# Patient Record
Sex: FEMALE | Race: BLACK OR AFRICAN AMERICAN | NOT HISPANIC OR LATINO | Employment: FULL TIME | URBAN - METROPOLITAN AREA
[De-identification: names, ages, dates, MRNs, and addresses within clinical notes are randomized per-mention and may not be internally consistent; named-entity substitution may affect disease eponyms.]

---

## 2020-11-20 ENCOUNTER — APPOINTMENT (OUTPATIENT)
Dept: GENERAL RADIOLOGY | Facility: HOSPITAL | Age: 35
End: 2020-11-20

## 2020-11-20 ENCOUNTER — HOSPITAL ENCOUNTER (EMERGENCY)
Facility: HOSPITAL | Age: 35
Discharge: HOME | End: 2020-11-20
Admitting: EMERGENCY MEDICINE

## 2020-11-20 VITALS
SYSTOLIC BLOOD PRESSURE: 111 MMHG | DIASTOLIC BLOOD PRESSURE: 70 MMHG | RESPIRATION RATE: 19 BRPM | HEART RATE: 87 BPM | BODY MASS INDEX: 43.32 KG/M2 | HEIGHT: 63 IN | WEIGHT: 244.49 LBS | OXYGEN SATURATION: 100 % | TEMPERATURE: 98.3 F

## 2020-11-20 DIAGNOSIS — R07.89 CHEST WALL PAIN: Primary | ICD-10-CM

## 2020-11-20 LAB
ALBUMIN SERPL-MCNC: 3.9 G/DL (ref 3.5–5.2)
ALBUMIN/GLOB SERPL: 1.4 G/DL
ALP SERPL-CCNC: 75 U/L (ref 39–117)
ALT SERPL W P-5'-P-CCNC: 13 U/L (ref 1–33)
ANION GAP SERPL CALCULATED.3IONS-SCNC: 10 MMOL/L (ref 5–15)
AST SERPL-CCNC: 15 U/L (ref 1–32)
BASOPHILS # BLD AUTO: 0 10*3/MM3 (ref 0–0.2)
BASOPHILS NFR BLD AUTO: 0.6 % (ref 0–1.5)
BILIRUB SERPL-MCNC: 0.3 MG/DL (ref 0–1.2)
BUN SERPL-MCNC: 8 MG/DL (ref 6–20)
BUN/CREAT SERPL: 9.9 (ref 7–25)
CALCIUM SPEC-SCNC: 9 MG/DL (ref 8.6–10.5)
CHLORIDE SERPL-SCNC: 103 MMOL/L (ref 98–107)
CO2 SERPL-SCNC: 24 MMOL/L (ref 22–29)
CREAT SERPL-MCNC: 0.81 MG/DL (ref 0.57–1)
DEPRECATED RDW RBC AUTO: 41.6 FL (ref 37–54)
EOSINOPHIL # BLD AUTO: 0.2 10*3/MM3 (ref 0–0.4)
EOSINOPHIL NFR BLD AUTO: 2.3 % (ref 0.3–6.2)
ERYTHROCYTE [DISTWIDTH] IN BLOOD BY AUTOMATED COUNT: 14.7 % (ref 12.3–15.4)
GFR SERPL CREATININE-BSD FRML MDRD: 98 ML/MIN/1.73
GLOBULIN UR ELPH-MCNC: 2.8 GM/DL
GLUCOSE SERPL-MCNC: 129 MG/DL (ref 65–99)
HCT VFR BLD AUTO: 39.6 % (ref 34–46.6)
HGB BLD-MCNC: 12.4 G/DL (ref 12–15.9)
HOLD SPECIMEN: NORMAL
LIPASE SERPL-CCNC: 28 U/L (ref 13–60)
LYMPHOCYTES # BLD AUTO: 2.4 10*3/MM3 (ref 0.7–3.1)
LYMPHOCYTES NFR BLD AUTO: 32.1 % (ref 19.6–45.3)
MCH RBC QN AUTO: 25.2 PG (ref 26.6–33)
MCHC RBC AUTO-ENTMCNC: 31.3 G/DL (ref 31.5–35.7)
MCV RBC AUTO: 80.7 FL (ref 79–97)
MONOCYTES # BLD AUTO: 0.6 10*3/MM3 (ref 0.1–0.9)
MONOCYTES NFR BLD AUTO: 8.1 % (ref 5–12)
NEUTROPHILS NFR BLD AUTO: 4.2 10*3/MM3 (ref 1.7–7)
NEUTROPHILS NFR BLD AUTO: 56.9 % (ref 42.7–76)
NRBC BLD AUTO-RTO: 0.1 /100 WBC (ref 0–0.2)
NT-PROBNP SERPL-MCNC: 24.4 PG/ML (ref 0–450)
PLATELET # BLD AUTO: 319 10*3/MM3 (ref 140–450)
PMV BLD AUTO: 7.6 FL (ref 6–12)
POTASSIUM SERPL-SCNC: 4 MMOL/L (ref 3.5–5.2)
PROT SERPL-MCNC: 6.7 G/DL (ref 6–8.5)
RBC # BLD AUTO: 4.91 10*6/MM3 (ref 3.77–5.28)
SARS-COV-2 RNA PNL SPEC NAA+PROBE: NOT DETECTED
SODIUM SERPL-SCNC: 137 MMOL/L (ref 136–145)
TROPONIN T SERPL-MCNC: <0.01 NG/ML (ref 0–0.03)
WBC # BLD AUTO: 7.4 10*3/MM3 (ref 3.4–10.8)
WHOLE BLOOD HOLD SPECIMEN: NORMAL

## 2020-11-20 PROCEDURE — 87635 SARS-COV-2 COVID-19 AMP PRB: CPT | Performed by: PHYSICIAN ASSISTANT

## 2020-11-20 PROCEDURE — 84484 ASSAY OF TROPONIN QUANT: CPT | Performed by: PHYSICIAN ASSISTANT

## 2020-11-20 PROCEDURE — 83880 ASSAY OF NATRIURETIC PEPTIDE: CPT | Performed by: PHYSICIAN ASSISTANT

## 2020-11-20 PROCEDURE — 96374 THER/PROPH/DIAG INJ IV PUSH: CPT

## 2020-11-20 PROCEDURE — 96375 TX/PRO/DX INJ NEW DRUG ADDON: CPT

## 2020-11-20 PROCEDURE — 71045 X-RAY EXAM CHEST 1 VIEW: CPT

## 2020-11-20 PROCEDURE — 25010000002 ONDANSETRON PER 1 MG: Performed by: PHYSICIAN ASSISTANT

## 2020-11-20 PROCEDURE — 85025 COMPLETE CBC W/AUTO DIFF WBC: CPT | Performed by: PHYSICIAN ASSISTANT

## 2020-11-20 PROCEDURE — 93005 ELECTROCARDIOGRAM TRACING: CPT

## 2020-11-20 PROCEDURE — 99283 EMERGENCY DEPT VISIT LOW MDM: CPT

## 2020-11-20 PROCEDURE — 83690 ASSAY OF LIPASE: CPT | Performed by: PHYSICIAN ASSISTANT

## 2020-11-20 PROCEDURE — 80053 COMPREHEN METABOLIC PANEL: CPT | Performed by: PHYSICIAN ASSISTANT

## 2020-11-20 PROCEDURE — 25010000002 KETOROLAC TROMETHAMINE PER 15 MG: Performed by: PHYSICIAN ASSISTANT

## 2020-11-20 RX ORDER — ONDANSETRON 2 MG/ML
4 INJECTION INTRAMUSCULAR; INTRAVENOUS ONCE
Status: COMPLETED | OUTPATIENT
Start: 2020-11-20 | End: 2020-11-20

## 2020-11-20 RX ORDER — SODIUM CHLORIDE 0.9 % (FLUSH) 0.9 %
10 SYRINGE (ML) INJECTION AS NEEDED
Status: DISCONTINUED | OUTPATIENT
Start: 2020-11-20 | End: 2020-11-20 | Stop reason: HOSPADM

## 2020-11-20 RX ORDER — KETOROLAC TROMETHAMINE 15 MG/ML
15 INJECTION, SOLUTION INTRAMUSCULAR; INTRAVENOUS ONCE
Status: COMPLETED | OUTPATIENT
Start: 2020-11-20 | End: 2020-11-20

## 2020-11-20 RX ORDER — METHOCARBAMOL 750 MG/1
750 TABLET, FILM COATED ORAL 3 TIMES DAILY
Qty: 45 TABLET | Refills: 0 | OUTPATIENT
Start: 2020-11-20 | End: 2021-02-04

## 2020-11-20 RX ORDER — DICLOFENAC SODIUM 75 MG/1
75 TABLET, DELAYED RELEASE ORAL 2 TIMES DAILY
Qty: 60 TABLET | Refills: 0 | OUTPATIENT
Start: 2020-11-20 | End: 2021-02-04

## 2020-11-20 RX ORDER — LIDOCAINE 50 MG/G
1 PATCH TOPICAL EVERY 24 HOURS
Qty: 30 PATCH | Refills: 0 | Status: ON HOLD | OUTPATIENT
Start: 2020-11-20 | End: 2022-08-04

## 2020-11-20 RX ADMIN — ONDANSETRON 4 MG: 2 INJECTION INTRAMUSCULAR; INTRAVENOUS at 15:30

## 2020-11-20 RX ADMIN — SODIUM CHLORIDE 500 ML: 0.9 INJECTION, SOLUTION INTRAVENOUS at 15:39

## 2020-11-20 RX ADMIN — NITROGLYCERIN 1 INCH: 20 OINTMENT TOPICAL at 15:30

## 2020-11-20 RX ADMIN — KETOROLAC TROMETHAMINE 15 MG: 15 INJECTION, SOLUTION INTRAMUSCULAR; INTRAVENOUS at 15:30

## 2020-11-20 NOTE — DISCHARGE INSTRUCTIONS
Return to the ER for any worsening symptoms.  Follow-up with your primary care provider for your scheduled appointment on Monday.  Initiate course of diclofenac may take Tylenol in addition to this do not take other NSAIDs.

## 2020-11-20 NOTE — ED NOTES
Pt reports chest pain that started last night. Pt reports pain is a 8/10 in her right upper chest.        Renata Bedolla, RN  11/20/20 8086

## 2020-11-20 NOTE — ED PROVIDER NOTES
Subjective   History:  She is a pleasant 35-year-old female who presents to the ER with right-sided chest pain radiating down her right arm with numbness and tingling in her hand along with associated nausea.  She reports that it started yesterday is not better with Motrin.  Has no history of heart disease is diabetic.    Onset: 1 day  Location: Right side of chest  Duration: Constant  Character: Sharp pain  Aggravating/Alleviating factors: Somewhat better if she leans forward  Radiation right arm  Severity: Moderate            Review of Systems   Constitutional: Negative for chills, diaphoresis, fatigue and fever.   Respiratory: Negative for cough, choking and shortness of breath.    Cardiovascular: Positive for chest pain.   Gastrointestinal: Positive for nausea. Negative for abdominal distention, abdominal pain and vomiting.   Genitourinary: Negative.    Musculoskeletal: Negative.    Skin: Negative.    Neurological: Negative.    Psychiatric/Behavioral: Negative.        No past medical history on file.    Allergies   Allergen Reactions   • Compazine [Prochlorperazine Edisylate] Mental Status Change   • Reglan [Metoclopramide] Palpitations   • Victoza [Liraglutide] GI Intolerance       No past surgical history on file.    No family history on file.    Social History     Socioeconomic History   • Marital status: Single     Spouse name: Not on file   • Number of children: Not on file   • Years of education: Not on file   • Highest education level: Not on file           Objective   Physical Exam  Vitals signs and nursing note reviewed.   Constitutional:       Appearance: She is well-developed.   HENT:      Head: Normocephalic and atraumatic.   Eyes:      Pupils: Pupils are equal, round, and reactive to light.   Neck:      Musculoskeletal: Normal range of motion.   Cardiovascular:      Rate and Rhythm: Normal rate and regular rhythm.      Heart sounds: Normal heart sounds.   Pulmonary:      Effort: Pulmonary effort is  normal.      Breath sounds: Normal breath sounds. No decreased breath sounds, wheezing, rhonchi or rales.   Chest:      Chest wall: Tenderness present.      Comments: Pain with palpation over right sided chest wall  Musculoskeletal: Normal range of motion.   Skin:     General: Skin is warm and dry.   Neurological:      Mental Status: She is alert and oriented to person, place, and time.   Psychiatric:         Behavior: Behavior normal.         Procedures           ED Course  ED Course as of Nov 20 1644 Fri Nov 20, 2020   1639 EKG interpreted by ER physician reviewed by myself.  Rate of 91 sinus rhythm left bundle branch block    [MG]      ED Course User Index  [MG] Lisa Mendoza PA-C               Xr Chest 1 View    Result Date: 11/20/2020  No acute chest finding.  Electronically Signed By-Jeanine King MD On:11/20/2020 3:16 PM This report was finalized on [id number] by  Jeanine King MD.    Labs Reviewed   COMPREHENSIVE METABOLIC PANEL - Abnormal; Notable for the following components:       Result Value    Glucose 129 (*)     All other components within normal limits    Narrative:     GFR Normal >60  Chronic Kidney Disease <60  Kidney Failure <15     CBC WITH AUTO DIFFERENTIAL - Abnormal; Notable for the following components:    MCH 25.2 (*)     MCHC 31.3 (*)     All other components within normal limits   COVID-19,ABBOTT IN-HOUSE,NP SWAB (NO TRANSPORT MEDIA) 2 HR TAT - Normal    Narrative:     Fact sheet for providers: https://www.fda.gov/media/180682/download     Fact sheet for patients: https://www.fda.gov/media/839661/download   LIPASE - Normal   TROPONIN (IN-HOUSE) - Normal    Narrative:     Troponin T Reference Range:  <= 0.03 ng/mL-   Negative for AMI  >0.03 ng/mL-     Abnormal for myocardial necrosis.  Clinicians would have to utilize clinical acumen, EKG, Troponin and serial changes to determine if it is an Acute Myocardial Infarction or myocardial injury due to an underlying chronic condition.        Results may be falsely decreased if patient taking Biotin.     BNP (IN-HOUSE) - Normal    Narrative:     Among patients with dyspnea, NT-proBNP is highly sensitive for the detection of acute congestive heart failure. In addition NT-proBNP of <300 pg/ml effectively rules out acute congestive heart failure with 99% negative predictive value.    Results may be falsely decreased if patient taking Biotin.     CBC AND DIFFERENTIAL    Narrative:     The following orders were created for panel order CBC & Differential.  Procedure                               Abnormality         Status                     ---------                               -----------         ------                     CBC Auto Differential[[id number]]        Abnormal            Final result                 Please view results for these tests on the individual orders.   EXTRA TUBES    Narrative:     The following orders were created for panel order Extra Tubes.  Procedure                               Abnormality         Status                     ---------                               -----------         ------                     Light Blue Top[[id number]]                                   Final result               Gold Top - SST[[id number]]                                   Final result               Green Top (Gel)[[id number]]                                  Final result                 Please view results for these tests on the individual orders.   LIGHT BLUE TOP   GOLD TOP - SST   GREEN TOP     Medications   sodium chloride 0.9 % flush 10 mL (has no administration in time range)   sodium chloride 0.9 % bolus 500 mL (500 mL Intravenous New Bag 11/20/20 1539)   ketorolac (TORADOL) injection 15 mg (15 mg Intravenous Given 11/20/20 1530)   ondansetron (ZOFRAN) injection 4 mg (4 mg Intravenous Given 11/20/20 1530)   nitroglycerin (NITROSTAT) ointment 1 inch (1 inch Topical Given 11/20/20 1530)                                  MDM  Number of  Diagnoses or Management Options  Chest wall pain:   Diagnosis management comments: I examined the patient using the appropriate personal protective equipment.      DISPOSITION:   Chart Review:  Comorbidity:  has no past medical history on file.  Differentials:this list is not all inclusive and does not constitute the entirety of considered causes --> musculoskeletal pain, CAD, pneumonia, Covid  ECG: interpreted by ER physician and reviewed by myself: Sinus rhythm with left bundle branch block no previous    Imaging: Was interpreted by physician and reviewed by myself:  Xr Chest 1 View    Result Date: 11/20/2020  No acute chest finding.  Electronically Signed By-Jeanine King MD On:11/20/2020 3:16 PM This report was finalized on [id number] by  Jeanine King MD.      Disposition/Treatment:    Patient is 35-year-old female who presents to the ER with right-sided chest pain some numbness in her right hand.  She had pain with palpation on her anterior surface and does report that she went back to full-time duty at work recently with repetitive motion.  I offered patient admittance versus outpatient follow-up and she has outpatient follow-up scheduled for Monday.  I believe she is having chest wall pain versus cardiac issue with her reproducible pain.  She was discharged home with Robaxin lidocaine patches and diclofenac she was stable at time of discharge careful return precaution follow-up instructions were provided she was stable at time of discharge and agreement with plan       Amount and/or Complexity of Data Reviewed  Clinical lab tests: reviewed  Tests in the radiology section of CPT®: reviewed  Tests in the medicine section of CPT®: reviewed    Patient Progress  Patient progress: stable      Final diagnoses:   Chest wall pain            Lisa Mendoza PA-C  11/20/20 7192

## 2020-11-22 LAB — QT INTERVAL: 401 MS

## 2021-01-16 ENCOUNTER — HOSPITAL ENCOUNTER (EMERGENCY)
Facility: HOSPITAL | Age: 36
Discharge: HOME | End: 2021-01-16
Attending: EMERGENCY MEDICINE | Admitting: EMERGENCY MEDICINE

## 2021-01-16 ENCOUNTER — APPOINTMENT (OUTPATIENT)
Dept: GENERAL RADIOLOGY | Facility: HOSPITAL | Age: 36
End: 2021-01-16

## 2021-01-16 VITALS
HEART RATE: 79 BPM | HEIGHT: 63 IN | TEMPERATURE: 98 F | RESPIRATION RATE: 18 BRPM | BODY MASS INDEX: 42.58 KG/M2 | DIASTOLIC BLOOD PRESSURE: 99 MMHG | SYSTOLIC BLOOD PRESSURE: 161 MMHG | WEIGHT: 240.3 LBS | OXYGEN SATURATION: 96 %
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
## 2021-01-16 DIAGNOSIS — S39.012A STRAIN OF LUMBAR REGION, INITIAL ENCOUNTER: Primary | ICD-10-CM

## 2021-01-16 PROCEDURE — 99283 EMERGENCY DEPT VISIT LOW MDM: CPT

## 2021-01-16 PROCEDURE — 72110 X-RAY EXAM L-2 SPINE 4/>VWS: CPT

## 2021-01-16 RX ORDER — CYCLOBENZAPRINE HCL 10 MG
10 TABLET ORAL 3 TIMES DAILY PRN
Qty: 20 TABLET | Refills: 0 | OUTPATIENT
Start: 2021-01-16 | End: 2021-02-04

## 2021-01-16 RX ORDER — NAPROXEN 500 MG/1
500 TABLET ORAL 2 TIMES DAILY PRN
Qty: 20 TABLET | Refills: 0 | OUTPATIENT
Start: 2021-01-16 | End: 2021-02-04

## 2021-01-16 RX ORDER — HYDROCODONE BITARTRATE AND ACETAMINOPHEN 7.5; 325 MG/1; MG/1
1 TABLET ORAL ONCE
Status: COMPLETED | OUTPATIENT
Start: 2021-01-16 | End: 2021-01-16

## 2021-01-16 RX ADMIN — HYDROCODONE BITARTRATE AND ACETAMINOPHEN 1 TABLET: 7.5; 325 TABLET ORAL at 19:28

## 2021-01-16 NOTE — ED PROVIDER NOTES
"Subjective   Chief complaint: Low back pain    35-year-old female presents with low back pain after a fall.  Patient states she went to get her oil changed in her vehicle and when she stepped out of the vehicle she slipped and hit the lower back on the door frame of the vehicle.  She denies hitting her head or no loss of consciousness.  She is having pain all throughout her lower back.  She denies any other injuries.      History provided by:  Patient      Review of Systems   Constitutional: Negative for fever.   HENT: Negative for congestion.    Respiratory: Negative for cough and shortness of breath.    Cardiovascular: Negative for chest pain.   Gastrointestinal: Negative for abdominal pain and vomiting.   Musculoskeletal: Positive for back pain.   Neurological: Negative for weakness and numbness.       No past medical history on file.    Allergies   Allergen Reactions   • Compazine [Prochlorperazine Edisylate] Mental Status Change   • Reglan [Metoclopramide] Palpitations   • Victoza [Liraglutide] GI Intolerance       No past surgical history on file.    No family history on file.    Social History     Socioeconomic History   • Marital status: Single     Spouse name: Not on file   • Number of children: Not on file   • Years of education: Not on file   • Highest education level: Not on file       BP (!) 161/106   Pulse 84   Temp 97.9 °F (36.6 °C)   Resp 17   Ht 160 cm (63\")   Wt 109 kg (240 lb 4.8 oz)   SpO2 96%   BMI 42.57 kg/m²       Objective   Physical Exam  Vitals signs and nursing note reviewed.   Constitutional:       Appearance: Normal appearance.   HENT:      Head: Normocephalic.   Eyes:      Pupils: Pupils are equal, round, and reactive to light.   Cardiovascular:      Rate and Rhythm: Normal rate and regular rhythm.   Pulmonary:      Effort: No respiratory distress.      Breath sounds: Normal breath sounds.   Abdominal:      Palpations: Abdomen is soft.      Tenderness: There is no abdominal " tenderness.   Musculoskeletal:      Comments: There is tenderness throughout the lumbar spine and bilateral paraspinal musculature.  There are no visible injuries.   Neurological:      General: No focal deficit present.      Mental Status: She is alert and oriented to person, place, and time.         Procedures           ED Course      Xr Spine Lumbar Complete 4+vw    Result Date: 1/16/2021  Normal radiographic appearance of the lumbar spine.  Electronically Signed By-Iam Alejandro DO On:1/16/2021 7:46 PM This report was finalized on [id number] by  Iam Alejandro DO.                                         MDM   X-ray of the lumbar spine is unremarkable.  Patient is well-appearing on exam.  She will be discharged with prescriptions for naproxen and Flexeril.      Final diagnoses:   Strain of lumbar region, initial encounter   Fall, initial encounter            Finesse Parekh MD  01/16/21 2007

## 2021-01-16 NOTE — ED NOTES
Attempted to assist patient in getting in gown, patient unable to tolerate taking off under shirts.     Mirta Acosta, KIN  01/16/21 1819

## 2021-01-17 NOTE — DISCHARGE INSTRUCTIONS
Follow-up with your primary doctor.  Return to the emergency room for any new or worsening symptoms or if you have any other questions or concerns.  Take medications as prescribed.  Ice to areas as needed.

## 2021-02-03 ENCOUNTER — HOSPITAL ENCOUNTER (EMERGENCY)
Facility: HOSPITAL | Age: 36
Discharge: HOME | End: 2021-02-04
Admitting: EMERGENCY MEDICINE
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
## 2021-02-03 DIAGNOSIS — M25.512 ACUTE PAIN OF LEFT SHOULDER: Primary | ICD-10-CM

## 2021-02-03 PROCEDURE — 96372 THER/PROPH/DIAG INJ SC/IM: CPT

## 2021-02-03 PROCEDURE — 99283 EMERGENCY DEPT VISIT LOW MDM: CPT

## 2021-02-03 PROCEDURE — 93005 ELECTROCARDIOGRAM TRACING: CPT

## 2021-02-04 ENCOUNTER — APPOINTMENT (OUTPATIENT)
Dept: GENERAL RADIOLOGY | Facility: HOSPITAL | Age: 36
End: 2021-02-04

## 2021-02-04 VITALS
BODY MASS INDEX: 41.29 KG/M2 | SYSTOLIC BLOOD PRESSURE: 138 MMHG | WEIGHT: 247.8 LBS | TEMPERATURE: 98.4 F | HEIGHT: 65 IN | DIASTOLIC BLOOD PRESSURE: 78 MMHG | OXYGEN SATURATION: 100 % | RESPIRATION RATE: 16 BRPM | HEART RATE: 78 BPM

## 2021-02-04 LAB — QT INTERVAL: 401 MS

## 2021-02-04 PROCEDURE — 73030 X-RAY EXAM OF SHOULDER: CPT

## 2021-02-04 PROCEDURE — 25010000002 ORPHENADRINE CITRATE PER 60 MG: Performed by: NURSE PRACTITIONER

## 2021-02-04 RX ORDER — ORPHENADRINE CITRATE 30 MG/ML
60 INJECTION INTRAMUSCULAR; INTRAVENOUS ONCE
Status: COMPLETED | OUTPATIENT
Start: 2021-02-04 | End: 2021-02-04

## 2021-02-04 RX ORDER — METHOCARBAMOL 500 MG/1
500 TABLET, FILM COATED ORAL 4 TIMES DAILY
Qty: 20 TABLET | Refills: 0 | Status: ON HOLD | OUTPATIENT
Start: 2021-02-04 | End: 2022-08-04

## 2021-02-04 RX ORDER — HYDROCODONE BITARTRATE AND ACETAMINOPHEN 5; 325 MG/1; MG/1
1 TABLET ORAL ONCE AS NEEDED
Status: COMPLETED | OUTPATIENT
Start: 2021-02-04 | End: 2021-02-04

## 2021-02-04 RX ORDER — MELOXICAM 7.5 MG/1
7.5 TABLET ORAL DAILY
Qty: 10 TABLET | Refills: 0 | Status: ON HOLD | OUTPATIENT
Start: 2021-02-04 | End: 2022-08-04

## 2021-02-04 RX ADMIN — HYDROCODONE BITARTRATE AND ACETAMINOPHEN 1 TABLET: 5; 325 TABLET ORAL at 01:14

## 2021-02-04 RX ADMIN — ORPHENADRINE CITRATE 60 MG: 30 INJECTION INTRAMUSCULAR; INTRAVENOUS at 00:09

## 2021-02-04 NOTE — ED PROVIDER NOTES
Subjective   Patient is a 35-year-old female presents emergency department with a complaint of left shoulder pain.  She reports that she has had pain for about 2 weeks, it started after she had a fall.  Patient reports she was seen in the ED at that time, however she had had pain in her back, which seemed to be more painful at the time, and her shoulder was not evaluated given that time.  She had no new recent injuries.  She denies any chest pain or shortness of breath.  No back pain.  Pain is worse with movement of her shoulder, lifting.  Improved with no movement.          Review of Systems   Constitutional: Negative for chills and fever.   Respiratory: Negative for chest tightness and shortness of breath.    Cardiovascular: Negative for chest pain, palpitations and leg swelling.   Gastrointestinal: Negative for abdominal pain, diarrhea, nausea and vomiting.   Musculoskeletal:        Left shoulder pain   Skin: Negative for color change, rash and wound.   Neurological: Negative for dizziness, syncope and light-headedness.       No past medical history on file.    Allergies   Allergen Reactions   • Compazine [Prochlorperazine Edisylate] Mental Status Change   • Reglan [Metoclopramide] Palpitations   • Victoza [Liraglutide] GI Intolerance       No past surgical history on file.    No family history on file.    Social History     Socioeconomic History   • Marital status: Single     Spouse name: Not on file   • Number of children: Not on file   • Years of education: Not on file   • Highest education level: Not on file           Objective   Physical Exam  Vitals signs and nursing note reviewed.   Constitutional:       Appearance: Normal appearance.   HENT:      Head: Normocephalic and atraumatic.      Nose: Nose normal.      Mouth/Throat:      Mouth: Mucous membranes are dry.      Pharynx: Oropharynx is clear.   Eyes:      Extraocular Movements: Extraocular movements intact.      Conjunctiva/sclera: Conjunctivae normal.  "     Pupils: Pupils are equal, round, and reactive to light.   Neck:      Musculoskeletal: Normal range of motion and neck supple.      Comments: No vertebral point tenderness or palpable step-offs are noted to the C-spine.  Cardiovascular:      Rate and Rhythm: Normal rate and regular rhythm.      Heart sounds: Normal heart sounds. No murmur. No friction rub. No gallop.    Pulmonary:      Effort: Pulmonary effort is normal.      Breath sounds: Normal breath sounds.   Abdominal:      General: Bowel sounds are normal.      Palpations: Abdomen is soft.   Musculoskeletal:      Left shoulder: She exhibits tenderness and pain. She exhibits normal range of motion, no bony tenderness, no swelling, no effusion, no crepitus, no deformity, no laceration, no spasm, normal pulse and normal strength.   Skin:     General: Skin is warm and dry.   Neurological:      Mental Status: She is alert and oriented to person, place, and time.   Psychiatric:         Mood and Affect: Mood normal.         Behavior: Behavior normal.         Procedures           ED Course   /78   Pulse 78   Temp 98.4 °F (36.9 °C) (Oral)   Resp 16   Ht 165.1 cm (65\")   Wt 112 kg (247 lb 12.8 oz)   SpO2 100%   BMI 41.24 kg/m²   Labs Reviewed - No data to display  Medications   orphenadrine (NORFLEX) injection 60 mg (60 mg Intramuscular Given 21 0009)   HYDROcodone-acetaminophen (NORCO) 5-325 MG per tablet 1 tablet (1 tablet Oral Given 21 0114)     No radiology results for the last day    ED Course as of  015   Thu 2021   0002 EKG independently viewed by me, Interpreted by ED physicisan Dr. Hernandez.  Rate:92Rhythm:SRPrevious EK2020  Similar to previous.     [LB]      ED Course User Index  [LB] Mya Portillo, APRN      Appropriate PPE was worn during the duration of the care for this patient while in the emergency department per [facility] guidelines                                     MDM  Number of Diagnoses " or Management Options  Acute pain of left shoulder:   Fall, initial encounter:   Diagnosis management comments: Differentials: Cardiac related shoulder pain, contusion, sprain, action  This list is not all inclusive and does not constitute the entireity of considered causes.     Labs reviewed by me and significant for the following: Not warranted     Imaging, Interpreted per radiologist, independently viewed by myself: No acute findings noted in the patient's shoulder x-ray    Patient was brought back to the emergency department room for evaluation and  placed on appropriate monitoring.   IV was established, blood work obtained.  Vital signs have  been reviewed. Patient is afebrile.  Present emergency department complaint of left shoulder pain that is post fall 2 weeks ago.  Patient's pain is significantly worse with range of motion, with palpation of the area.  It does appear to be musculoskeletal related.  Her x-ray is negative here in the ED, she will be given a sling, and placed on anti-inflammatory pain medication.  Patient reports that she was on anti-inflammatories and muscle aches in the past, but is not improving, however I do feel it is the appropriate treatment at this time.  Not feel opiate pain medication is indicated.  Advised her to follow-up with her primary care provider as well as orthopedics.    Plan and Disposition: I spoke with the patient at the bedside regarding their plan of care, discharge instruction, home care, prescriptions, and importance follow-up.  We discussed test results at the bedside.  Patient was made aware of indications to return to the emergency department.  Patient agrees with the current plan of care for discharge, verbalized understanding of all instructions    Pt is aware that discharge does not mean that nothing is wrong but it indicates no emergency is present and they must continue care with follow-up as given below or physician of their choice           Amount and/or  Complexity of Data Reviewed  Tests in the medicine section of CPT®: reviewed  Review and summarize past medical records: yes (Reviewed patient's CMP from 11/20/2020.  Normal GFR and creatinine.)  Independent visualization of images, tracings, or specimens: yes    Patient Progress  Patient progress: stable      Final diagnoses:   Acute pain of left shoulder   Fall, initial encounter            Mya Portillo, APRN  02/04/21 0154

## 2021-02-04 NOTE — DISCHARGE INSTRUCTIONS
Please follow with your primary care doctor, call tomorrow for an appointment  Please follow-up with orthopedics, call tomorrow for an appointment  Wear sling for comfort  Please discontinue any other anti-inflammatory medications that were prescribed, as well as the muscle relaxants.  Please begin meloxicam and Robaxin.

## 2021-04-05 ENCOUNTER — HOSPITAL ENCOUNTER (EMERGENCY)
Facility: HOSPITAL | Age: 36
Discharge: HOME | End: 2021-04-05
Admitting: EMERGENCY MEDICINE

## 2021-04-05 ENCOUNTER — APPOINTMENT (OUTPATIENT)
Dept: GENERAL RADIOLOGY | Facility: HOSPITAL | Age: 36
End: 2021-04-05

## 2021-04-05 VITALS
WEIGHT: 237.44 LBS | DIASTOLIC BLOOD PRESSURE: 76 MMHG | OXYGEN SATURATION: 97 % | TEMPERATURE: 98.4 F | HEART RATE: 94 BPM | HEIGHT: 65 IN | SYSTOLIC BLOOD PRESSURE: 132 MMHG | BODY MASS INDEX: 39.56 KG/M2 | RESPIRATION RATE: 16 BRPM

## 2021-04-05 DIAGNOSIS — S40.811A ABRASION OF RIGHT UPPER EXTREMITY, INITIAL ENCOUNTER: Primary | ICD-10-CM

## 2021-04-05 DIAGNOSIS — M79.601 RIGHT ARM PAIN: ICD-10-CM

## 2021-04-05 PROCEDURE — 90715 TDAP VACCINE 7 YRS/> IM: CPT | Performed by: NURSE PRACTITIONER

## 2021-04-05 PROCEDURE — 96372 THER/PROPH/DIAG INJ SC/IM: CPT

## 2021-04-05 PROCEDURE — 99283 EMERGENCY DEPT VISIT LOW MDM: CPT

## 2021-04-05 PROCEDURE — 90471 IMMUNIZATION ADMIN: CPT | Performed by: NURSE PRACTITIONER

## 2021-04-05 PROCEDURE — 25010000002 TDAP 5-2.5-18.5 LF-MCG/0.5 SUSPENSION: Performed by: NURSE PRACTITIONER

## 2021-04-05 PROCEDURE — 73090 X-RAY EXAM OF FOREARM: CPT

## 2021-04-05 RX ORDER — DICLOFENAC SODIUM 75 MG/1
75 TABLET, DELAYED RELEASE ORAL 2 TIMES DAILY PRN
Qty: 20 TABLET | Refills: 0 | Status: ON HOLD | OUTPATIENT
Start: 2021-04-05 | End: 2022-08-04

## 2021-04-05 RX ORDER — IBUPROFEN 600 MG/1
600 TABLET ORAL ONCE
Status: COMPLETED | OUTPATIENT
Start: 2021-04-05 | End: 2021-04-05

## 2021-04-05 RX ORDER — ACETAMINOPHEN 500 MG
1000 TABLET ORAL ONCE
Status: COMPLETED | OUTPATIENT
Start: 2021-04-05 | End: 2021-04-05

## 2021-04-05 RX ADMIN — ACETAMINOPHEN 1000 MG: 500 TABLET ORAL at 22:35

## 2021-04-05 RX ADMIN — TETANUS TOXOID, REDUCED DIPHTHERIA TOXOID AND ACELLULAR PERTUSSIS VACCINE, ADSORBED 0.5 ML: 5; 2.5; 8; 8; 2.5 SUSPENSION INTRAMUSCULAR at 22:37

## 2021-04-05 RX ADMIN — IBUPROFEN 600 MG: 600 TABLET, FILM COATED ORAL at 22:35

## 2021-04-06 NOTE — ED PROVIDER NOTES
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  She states that the pain is worse with movement of the arm she rates her pain a 7/10.  She denies any numbness or tingling at this time.  She denies any other injuries.          Review of Systems   Constitutional: Negative for chills, fatigue and fever.   HENT: Negative for congestion, tinnitus and trouble swallowing.    Eyes: Negative for photophobia, discharge and redness.   Respiratory: Negative for cough and shortness of breath.    Cardiovascular: Negative for chest pain and palpitations.   Gastrointestinal: Negative for abdominal pain, diarrhea, nausea and vomiting.   Genitourinary: Negative for dysuria, frequency and urgency.   Musculoskeletal: Negative for back pain, joint swelling and myalgias.        Right arm pain   Skin: Positive for wound. Negative for rash.        Abrasion to the right forearm   Neurological: Negative for dizziness and headaches.   Psychiatric/Behavioral: Negative for confusion.   All other systems reviewed and are negative.      History reviewed. No pertinent past medical history.    Allergies   Allergen Reactions   • Compazine [Prochlorperazine Edisylate] Mental Status Change   • Reglan [Metoclopramide] Palpitations   • Victoza [Liraglutide] GI Intolerance       History reviewed. No pertinent surgical history.    History reviewed. No pertinent family history.    Social History     Socioeconomic History   • Marital status: Single     Spouse name: Not on file   • Number of children: Not on file   • Years of education: Not on file   • Highest education level: Not on file           Objective   Physical Exam  Vitals reviewed.   Constitutional:       Appearance: Normal appearance. She is well-developed. She is obese.   HENT:      Head: Normocephalic and atraumatic.      Right Ear: External ear normal.      Left  "Ear: External ear normal.   Eyes:      Conjunctiva/sclera: Conjunctivae normal.      Pupils: Pupils are equal, round, and reactive to light.   Cardiovascular:      Rate and Rhythm: Normal rate and regular rhythm.      Heart sounds: Normal heart sounds.   Pulmonary:      Effort: Pulmonary effort is normal. No respiratory distress.      Breath sounds: Normal breath sounds. No wheezing.   Abdominal:      Palpations: Abdomen is soft.   Musculoskeletal:         General: No deformity. Normal range of motion.      Left forearm: Normal.        Arms:       Cervical back: Normal range of motion and neck supple.      Comments: There is a good distal pulse good cap refill distally neurovascularly intact   Skin:     General: Skin is warm and dry.      Capillary Refill: Capillary refill takes less than 2 seconds.   Neurological:      Mental Status: She is alert and oriented to person, place, and time.      GCS: GCS eye subscore is 4. GCS verbal subscore is 5. GCS motor subscore is 6.      Cranial Nerves: No cranial nerve deficit.      Sensory: No sensory deficit.      Deep Tendon Reflexes: Reflexes normal.   Psychiatric:         Mood and Affect: Mood normal.         Behavior: Behavior normal.         Thought Content: Thought content normal.         Judgment: Judgment normal.         Procedures           ED Course      /97   Pulse 101   Temp 98.1 °F (36.7 °C) (Oral)   Resp 19   Ht 165.1 cm (65\")   Wt 108 kg (237 lb 7 oz)   SpO2 97%   BMI 39.51 kg/m²   Labs Reviewed - No data to display  Medications   Tdap (BOOSTRIX) injection 0.5 mL (0.5 mL Intramuscular Given 4/5/21 2237)   ibuprofen (ADVIL,MOTRIN) tablet 600 mg (600 mg Oral Given 4/5/21 2235)   acetaminophen (TYLENOL) tablet 1,000 mg (1,000 mg Oral Given 4/5/21 2235)     XR Forearm 2 View Right    Result Date: 4/5/2021  Negative forearm radiograph.  Electronically Signed By-Sarina Herrera MD On:4/5/2021 10:38 PM This report was finalized on [id number] by  Sarina " MD Sharon.                                         MDM  Number of Diagnoses or Management Options  Abrasion of right upper extremity, initial encounter  Right arm pain  Diagnosis management comments: Patient was given tetanus shot she was given Tylenol and Motrin for discomfort.  X-ray was obtained and found to be within normal limits.  Patient was advised to use diclofenac as needed for inflammation and pain to follow-up with Dr. Hinojosa and if pain is persistent and to return if worse she verbalized and understood discharge instructions.       Amount and/or Complexity of Data Reviewed  Tests in the radiology section of CPT®: reviewed    Risk of Complications, Morbidity, and/or Mortality  Presenting problems: minimal  Diagnostic procedures: minimal  Management options: minimal    Patient Progress  Patient progress: improved      Final diagnoses:   Abrasion of right upper extremity, initial encounter   Right arm pain       ED Disposition  ED Disposition     ED Disposition Condition Comment    Discharge Stable           Caro Roman, APRN  [address] [facility] [place] [id number] [phone]    In 3 days  If symptoms worsen, As needed    Rajesh Sanford MD  [address] [place] [address] [phone]      If symptoms worsen         Medication List      New Prescriptions    diclofenac 75 MG EC tablet  Commonly known as: VOLTAREN  Take 1 tablet by mouth 2 (Two) Times a Day As Needed (pain).           Where to Get Your Medications      These medications were sent to [facility]/pharmacy #[id number] - [place], [place] - [address] [place] [address] - [phone]  - [phone] FX  [address] [place] [address] [place] [address]    Hours: 24-hours Phone: [phone]   · diclofenac 75 MG EC tablet          Tyesha Gupta, APRN  04/05/21 2078

## 2021-04-06 NOTE — DISCHARGE INSTRUCTIONS
Use Diclofenac for pain- do not mix with advil/ motrin or aleve.     Low up with primary care or Dr. Hinojosa in for further evaluation and treatment or return to the ER if worse
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
## 2021-06-18 ENCOUNTER — HOSPITAL ENCOUNTER (EMERGENCY)
Facility: HOSPITAL | Age: 36
Discharge: HOME | End: 2021-06-18
Attending: EMERGENCY MEDICINE | Admitting: EMERGENCY MEDICINE

## 2021-06-18 VITALS
TEMPERATURE: 98.7 F | HEART RATE: 85 BPM | OXYGEN SATURATION: 100 % | WEIGHT: 244.71 LBS | DIASTOLIC BLOOD PRESSURE: 82 MMHG | SYSTOLIC BLOOD PRESSURE: 128 MMHG | RESPIRATION RATE: 16 BRPM | BODY MASS INDEX: 41.78 KG/M2 | HEIGHT: 64 IN

## 2021-06-18 DIAGNOSIS — S61.431A PUNCTURE WOUND OF RIGHT HAND WITHOUT FOREIGN BODY, INITIAL ENCOUNTER: Primary | ICD-10-CM

## 2021-06-18 PROCEDURE — 25010000002 TDAP 5-2.5-18.5 LF-MCG/0.5 SUSPENSION: Performed by: EMERGENCY MEDICINE

## 2021-06-18 PROCEDURE — 90471 IMMUNIZATION ADMIN: CPT | Performed by: EMERGENCY MEDICINE

## 2021-06-18 PROCEDURE — 90715 TDAP VACCINE 7 YRS/> IM: CPT | Performed by: EMERGENCY MEDICINE

## 2021-06-18 PROCEDURE — 90471 IMMUNIZATION ADMIN: CPT

## 2021-06-18 PROCEDURE — 99283 EMERGENCY DEPT VISIT LOW MDM: CPT

## 2021-06-18 RX ADMIN — TETANUS TOXOID, REDUCED DIPHTHERIA TOXOID AND ACELLULAR PERTUSSIS VACCINE, ADSORBED 0.5 ML: 5; 2.5; 8; 8; 2.5 SUSPENSION INTRAMUSCULAR at 18:48

## 2021-06-18 NOTE — ED PROVIDER NOTES
Subjective   Chief complaint: Puncture wound right hand    36-year-old female presents with a puncture wound to her right hand.  Patient states she was on break at work and was helping her friend change her tire.  She put her hand on the ground and sustained a puncture wound by a nail that was on the ground.  The wound is on her right palm.  She states there was minimal bleeding.  She states she did not feel like it was a big deal but her work made her come to the emergency room.  She does not remember when she last had a tetanus shot.      History provided by:  Patient      Review of Systems   Constitutional: Negative for fever.   Respiratory: Negative for cough and shortness of breath.    Cardiovascular: Negative for chest pain.   Gastrointestinal: Negative for abdominal pain.   Musculoskeletal: Negative for back pain.   Skin: Positive for wound.       No past medical history on file.    Allergies   Allergen Reactions   • Compazine [Prochlorperazine Edisylate] Mental Status Change   • Reglan [Metoclopramide] Palpitations   • Victoza [Liraglutide] GI Intolerance       No past surgical history on file.    No family history on file.    Social History     Socioeconomic History   • Marital status: Single     Spouse name: Not on file   • Number of children: Not on file   • Years of education: Not on file   • Highest education level: Not on file           Objective   Physical Exam  Vitals and nursing note reviewed.   Constitutional:       Appearance: Normal appearance.   Eyes:      Pupils: Pupils are equal, round, and reactive to light.   Cardiovascular:      Rate and Rhythm: Normal rate and regular rhythm.   Pulmonary:      Effort: Pulmonary effort is normal.      Breath sounds: Normal breath sounds.   Skin:     Comments: Small puncture wound to the palm of the right hand with no active bleeding.  Neurovascular intact distally.  Full range of motion.   Neurological:      Mental Status: She is alert and oriented to  person, place, and time.         Procedures           ED Course                                           MDM   Patient was given a tetanus shot.  She had bacitracin applied to the puncture wound.  She is stable for discharge.      Final diagnoses:   Puncture wound of right hand without foreign body, initial encounter       ED Disposition  ED Disposition     ED Disposition Condition Comment    Discharge Stable           Caro Roman, APRN  [address] [facility] [place] [address]  [phone]    Call in 2 days           Medication List      No changes were made to your prescriptions during this visit.          Finesse Parekh MD  06/18/21 2339

## 2021-07-09 ENCOUNTER — HOSPITAL ENCOUNTER (EMERGENCY)
Facility: HOSPITAL | Age: 36
Discharge: HOME | End: 2021-07-09
Attending: EMERGENCY MEDICINE | Admitting: EMERGENCY MEDICINE

## 2021-07-09 ENCOUNTER — APPOINTMENT (OUTPATIENT)
Dept: CT IMAGING | Facility: HOSPITAL | Age: 36
End: 2021-07-09

## 2021-07-09 VITALS
WEIGHT: 253.31 LBS | TEMPERATURE: 98 F | RESPIRATION RATE: 16 BRPM | OXYGEN SATURATION: 100 % | DIASTOLIC BLOOD PRESSURE: 80 MMHG | BODY MASS INDEX: 44.88 KG/M2 | SYSTOLIC BLOOD PRESSURE: 119 MMHG | HEART RATE: 82 BPM | HEIGHT: 63 IN

## 2021-07-09 DIAGNOSIS — T67.5XXA HEAT EXHAUSTION, INITIAL ENCOUNTER: ICD-10-CM

## 2021-07-09 DIAGNOSIS — I10 UNCONTROLLED HYPERTENSION: ICD-10-CM

## 2021-07-09 DIAGNOSIS — R51.9 NONINTRACTABLE HEADACHE, UNSPECIFIED CHRONICITY PATTERN, UNSPECIFIED HEADACHE TYPE: Primary | ICD-10-CM

## 2021-07-09 LAB
ANION GAP SERPL CALCULATED.3IONS-SCNC: 9 MMOL/L (ref 5–15)
BASOPHILS # BLD AUTO: 0 10*3/MM3 (ref 0–0.2)
BASOPHILS NFR BLD AUTO: 0.1 % (ref 0–1.5)
BUN SERPL-MCNC: 6 MG/DL (ref 6–20)
BUN/CREAT SERPL: 8.8 (ref 7–25)
CALCIUM SPEC-SCNC: 8.3 MG/DL (ref 8.6–10.5)
CHLORIDE SERPL-SCNC: 102 MMOL/L (ref 98–107)
CO2 SERPL-SCNC: 28 MMOL/L (ref 22–29)
CREAT SERPL-MCNC: 0.68 MG/DL (ref 0.57–1)
DEPRECATED RDW RBC AUTO: 42.4 FL (ref 37–54)
EOSINOPHIL # BLD AUTO: 0.1 10*3/MM3 (ref 0–0.4)
EOSINOPHIL NFR BLD AUTO: 2.2 % (ref 0.3–6.2)
ERYTHROCYTE [DISTWIDTH] IN BLOOD BY AUTOMATED COUNT: 14.9 % (ref 12.3–15.4)
GFR SERPL CREATININE-BSD FRML MDRD: 119 ML/MIN/1.73
GLUCOSE SERPL-MCNC: 186 MG/DL (ref 65–99)
HCG SERPL QL: NEGATIVE
HCT VFR BLD AUTO: 38 % (ref 34–46.6)
HGB BLD-MCNC: 12.2 G/DL (ref 12–15.9)
LYMPHOCYTES # BLD AUTO: 2.5 10*3/MM3 (ref 0.7–3.1)
LYMPHOCYTES NFR BLD AUTO: 37.8 % (ref 19.6–45.3)
MCH RBC QN AUTO: 26.2 PG (ref 26.6–33)
MCHC RBC AUTO-ENTMCNC: 32 G/DL (ref 31.5–35.7)
MCV RBC AUTO: 81.8 FL (ref 79–97)
MONOCYTES # BLD AUTO: 0.5 10*3/MM3 (ref 0.1–0.9)
MONOCYTES NFR BLD AUTO: 8.1 % (ref 5–12)
NEUTROPHILS NFR BLD AUTO: 3.4 10*3/MM3 (ref 1.7–7)
NEUTROPHILS NFR BLD AUTO: 51.8 % (ref 42.7–76)
NRBC BLD AUTO-RTO: 0.1 /100 WBC (ref 0–0.2)
PLATELET # BLD AUTO: 287 10*3/MM3 (ref 140–450)
PMV BLD AUTO: 7.8 FL (ref 6–12)
POTASSIUM SERPL-SCNC: 3.7 MMOL/L (ref 3.5–5.2)
RBC # BLD AUTO: 4.65 10*6/MM3 (ref 3.77–5.28)
SODIUM SERPL-SCNC: 139 MMOL/L (ref 136–145)
WBC # BLD AUTO: 6.6 10*3/MM3 (ref 3.4–10.8)

## 2021-07-09 PROCEDURE — 25010000002 ONDANSETRON PER 1 MG: Performed by: EMERGENCY MEDICINE

## 2021-07-09 PROCEDURE — 25010000002 KETOROLAC TROMETHAMINE PER 15 MG: Performed by: EMERGENCY MEDICINE

## 2021-07-09 PROCEDURE — 85025 COMPLETE CBC W/AUTO DIFF WBC: CPT | Performed by: EMERGENCY MEDICINE

## 2021-07-09 PROCEDURE — 25010000002 DIPHENHYDRAMINE PER 50 MG: Performed by: EMERGENCY MEDICINE

## 2021-07-09 PROCEDURE — 99283 EMERGENCY DEPT VISIT LOW MDM: CPT

## 2021-07-09 PROCEDURE — 96374 THER/PROPH/DIAG INJ IV PUSH: CPT

## 2021-07-09 PROCEDURE — 80048 BASIC METABOLIC PNL TOTAL CA: CPT | Performed by: EMERGENCY MEDICINE

## 2021-07-09 PROCEDURE — 70450 CT HEAD/BRAIN W/O DYE: CPT

## 2021-07-09 PROCEDURE — 96375 TX/PRO/DX INJ NEW DRUG ADDON: CPT

## 2021-07-09 PROCEDURE — 84703 CHORIONIC GONADOTROPIN ASSAY: CPT | Performed by: EMERGENCY MEDICINE

## 2021-07-09 RX ORDER — PROCHLORPERAZINE EDISYLATE 5 MG/ML
10 INJECTION INTRAMUSCULAR; INTRAVENOUS ONCE
Status: DISCONTINUED | OUTPATIENT
Start: 2021-07-09 | End: 2021-07-09

## 2021-07-09 RX ORDER — DIPHENHYDRAMINE HYDROCHLORIDE 50 MG/ML
25 INJECTION INTRAMUSCULAR; INTRAVENOUS ONCE
Status: COMPLETED | OUTPATIENT
Start: 2021-07-09 | End: 2021-07-09

## 2021-07-09 RX ORDER — KETOROLAC TROMETHAMINE 15 MG/ML
15 INJECTION, SOLUTION INTRAMUSCULAR; INTRAVENOUS ONCE
Status: COMPLETED | OUTPATIENT
Start: 2021-07-09 | End: 2021-07-09

## 2021-07-09 RX ORDER — ONDANSETRON 2 MG/ML
4 INJECTION INTRAMUSCULAR; INTRAVENOUS ONCE
Status: COMPLETED | OUTPATIENT
Start: 2021-07-09 | End: 2021-07-09

## 2021-07-09 RX ADMIN — ONDANSETRON 4 MG: 2 INJECTION INTRAMUSCULAR; INTRAVENOUS at 19:24

## 2021-07-09 RX ADMIN — DIPHENHYDRAMINE HYDROCHLORIDE 25 MG: 50 INJECTION INTRAMUSCULAR; INTRAVENOUS at 19:16

## 2021-07-09 RX ADMIN — SODIUM CHLORIDE 500 ML: 9 INJECTION, SOLUTION INTRAVENOUS at 18:52

## 2021-07-09 RX ADMIN — KETOROLAC TROMETHAMINE 15 MG: 15 INJECTION, SOLUTION INTRAMUSCULAR; INTRAVENOUS at 19:16

## 2021-07-09 NOTE — ED PROVIDER NOTES
Subjective   History of Present Illness  Headache  36-year-old female states she has had bitemporal and bifrontal headache and pressure over the last 2 weeks.  States is worse when she wakes up in the morning and progressively improves throughout the day with over-the-counter headache medication.  She reports no thunderclap onset or fevers or chills or focal numbness or weakness.  She states she has had a couple of times over the last 2 weeks where her vision felt slightly blurred but did not persist.  She reports no nausea or vomiting.  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    Eyes: Negative.    Respiratory: Negative.    Cardiovascular: Negative.    Gastrointestinal: Negative.    Genitourinary: Negative.    Musculoskeletal: Negative.    Skin: Negative.    Neurological: Positive for headaches. Negative for weakness and numbness.   Psychiatric/Behavioral: Negative.        No past medical history on file.    Allergies   Allergen Reactions   • Compazine [Prochlorperazine Edisylate] Mental Status Change   • Reglan [Metoclopramide] Palpitations   • Victoza [Liraglutide] GI Intolerance       No past surgical history on file.    No family history on file.    Social History     Socioeconomic History   • Marital status: Single     Spouse name: Not on file   • Number of children: Not on file   • Years of education: Not on file   • Highest education level: Not on file     Prior to Admission medications    Medication Sig Start Date End Date Taking? Authorizing Provider   diclofenac (VOLTAREN) 75 MG EC tablet Take 1 tablet by mouth 2 (Two) Times a Day As Needed (pain). 4/5/21   Tyesha Gupta APRN   lidocaine (LIDODERM) 5 % Place 1 patch on the skin as directed by provider Daily. Remove & Discard patch within 12 hours or as directed by MD 11/20/20   Lisa Mendoza PA-C   meloxicam (MOBIC) 7.5 MG tablet Take 1 tablet by mouth Daily. 2/4/21   Bandar  "NIKI Gonzalez   methocarbamol (ROBAXIN) 500 MG tablet Take 1 tablet by mouth 4 (Four) Times a Day. 2/4/21   Mya Portillo APRN     /97 (BP Location: Left arm, Patient Position: Sitting)   Pulse 89   Temp 97.9 °F (36.6 °C) (Oral)   Resp 16   Ht 160 cm (63\")   Wt 115 kg (253 lb 4.9 oz)   SpO2 100%   Breastfeeding No   BMI 44.87 kg/m²   I examined the patient using the appropriate personal protective equipment.          Objective   Physical Exam  General: Well-developed well-appearing, no acute distress, alert and appropriate  Eyes: Pupils round and reactive, sclera nonicteric, limited funduscopic exam normal  HEENT: Mucous membranes moist, no mucosal swelling  Neck: Supple, no nuchal rigidity, no lymphadenopathy  Respirations: Respirations nonlabored, equal breath sounds bilaterally, clear lungs  Heart regular rate and rhythm, no murmurs rubs or gallops,   Abdomen soft nontender nondistended, no hepatosplenomegaly,   Extremities no clubbing cyanosis or edema, calves are symmetric and nontender  Neuro cranial nerves II through XII intact , normal sensory/motor function and strength in four extremities, no slurred speech, no facial droop, normal finger to nose, normal heel to shin, no nuchal rigidity  Psych oriented, pleasant affect  Skin no rash, brisk cap refill  Procedures           ED Course            Results for orders placed or performed during the hospital encounter of 07/09/21   Basic Metabolic Panel    Specimen: Blood   Result Value Ref Range    Glucose 186 (H) 65 - 99 mg/dL    BUN 6 6 - 20 mg/dL    Creatinine 0.68 0.57 - 1.00 mg/dL    Sodium 139 136 - 145 mmol/L    Potassium 3.7 3.5 - 5.2 mmol/L    Chloride 102 98 - 107 mmol/L    CO2 28.0 22.0 - 29.0 mmol/L    Calcium 8.3 (L) 8.6 - 10.5 mg/dL    eGFR  African Amer 119 >60 mL/min/1.73    BUN/Creatinine Ratio 8.8 7.0 - 25.0    Anion Gap 9.0 5.0 - 15.0 mmol/L   hCG, Serum, Qualitative    Specimen: Blood   Result Value Ref Range    HCG " Qualitative Negative Negative   CBC Auto Differential    Specimen: Blood   Result Value Ref Range    WBC 6.60 3.40 - 10.80 10*3/mm3    RBC 4.65 3.77 - 5.28 10*6/mm3    Hemoglobin 12.2 12.0 - 15.9 g/dL    Hematocrit 38.0 34.0 - 46.6 %    MCV 81.8 79.0 - 97.0 fL    MCH 26.2 (L) 26.6 - 33.0 pg    MCHC 32.0 31.5 - 35.7 g/dL    RDW 14.9 12.3 - 15.4 %    RDW-SD 42.4 37.0 - 54.0 fl    MPV 7.8 6.0 - 12.0 fL    Platelets 287 140 - 450 10*3/mm3    Neutrophil % 51.8 42.7 - 76.0 %    Lymphocyte % 37.8 19.6 - 45.3 %    Monocyte % 8.1 5.0 - 12.0 %    Eosinophil % 2.2 0.3 - 6.2 %    Basophil % 0.1 0.0 - 1.5 %    Neutrophils, Absolute 3.40 1.70 - 7.00 10*3/mm3    Lymphocytes, Absolute 2.50 0.70 - 3.10 10*3/mm3    Monocytes, Absolute 0.50 0.10 - 0.90 10*3/mm3    Eosinophils, Absolute 0.10 0.00 - 0.40 10*3/mm3    Basophils, Absolute 0.00 0.00 - 0.20 10*3/mm3    nRBC 0.1 0.0 - 0.2 /100 WBC     CT Head Without Contrast    Result Date: 7/9/2021  No acute intracranial abnormality.  Electronically Signed By-Santino Nix MD On:7/9/2021 8:10 PM This report was finalized on [id number] by  Santino Nix MD.                                      MDM  Patient describes working in a hot environment over the last several weeks and over the last couple weeks has had a daily headache. She has a normal neurologic exam. CT scan of the head shows no acute intracranial normality. She does have a history of hypertension and diabetes as well which are not well controlled. She does appear somewhat dehydrated was given some IV fluids. She was ordered some Toradol for headache and was resting comfortably reexamination. She continues to have a normal neurologic exam and is discharged good condition and was given warning signs for return.  Final diagnoses:   Nonintractable headache, unspecified chronicity pattern, unspecified headache type   Heat exhaustion, initial encounter   Uncontrolled hypertension       ED Disposition  ED Disposition     ED  Disposition Condition Comment    Discharge Stable           Caro Roman, APRN  [address] [facility] [place] [address]  [phone]    Schedule an appointment as soon as possible for a visit in 1 week           Medication List      No changes were made to your prescriptions during this visit.          Claude Parekh MD  07/09/21 2444

## 2021-07-10 NOTE — ED NOTES
Pt c/o h/a x2 wks with intermittent blurred vision and dizziness today.     Renee Prado RN  07/09/21 2007

## 2021-07-10 NOTE — DISCHARGE INSTRUCTIONS
Rest, avoid the heat, drink plenty of fluids, follow-up with your doctor this week. Return for increased pain, numbness, weakness, blurry vision or any other concerns

## 2021-07-14 PROCEDURE — 99283 EMERGENCY DEPT VISIT LOW MDM: CPT

## 2021-07-14 PROCEDURE — 96374 THER/PROPH/DIAG INJ IV PUSH: CPT

## 2021-07-15 ENCOUNTER — HOSPITAL ENCOUNTER (EMERGENCY)
Facility: HOSPITAL | Age: 36
Discharge: HOME | End: 2021-07-15
Attending: EMERGENCY MEDICINE | Admitting: EMERGENCY MEDICINE

## 2021-07-15 VITALS
BODY MASS INDEX: 43.98 KG/M2 | RESPIRATION RATE: 19 BRPM | SYSTOLIC BLOOD PRESSURE: 140 MMHG | DIASTOLIC BLOOD PRESSURE: 63 MMHG | OXYGEN SATURATION: 100 % | HEIGHT: 63 IN | HEART RATE: 104 BPM | WEIGHT: 248.24 LBS | TEMPERATURE: 98.4 F

## 2021-07-15 DIAGNOSIS — R73.9 HYPERGLYCEMIA: Primary | ICD-10-CM

## 2021-07-15 DIAGNOSIS — R51.9 NONINTRACTABLE HEADACHE, UNSPECIFIED CHRONICITY PATTERN, UNSPECIFIED HEADACHE TYPE: ICD-10-CM

## 2021-07-15 DIAGNOSIS — R11.2 NAUSEA AND VOMITING, INTRACTABILITY OF VOMITING NOT SPECIFIED, UNSPECIFIED VOMITING TYPE: ICD-10-CM

## 2021-07-15 DIAGNOSIS — R42 DIZZINESS: ICD-10-CM

## 2021-07-15 LAB
ANION GAP SERPL CALCULATED.3IONS-SCNC: 11 MMOL/L (ref 5–15)
BASOPHILS # BLD AUTO: 0 10*3/MM3 (ref 0–0.2)
BASOPHILS NFR BLD AUTO: 0.2 % (ref 0–1.5)
BILIRUB UR QL STRIP: NEGATIVE
BUN SERPL-MCNC: 10 MG/DL (ref 6–20)
BUN/CREAT SERPL: 12.8 (ref 7–25)
CALCIUM SPEC-SCNC: 9.3 MG/DL (ref 8.6–10.5)
CHLORIDE SERPL-SCNC: 101 MMOL/L (ref 98–107)
CLARITY UR: CLEAR
CO2 SERPL-SCNC: 27 MMOL/L (ref 22–29)
COLOR UR: YELLOW
CREAT SERPL-MCNC: 0.78 MG/DL (ref 0.57–1)
DEPRECATED RDW RBC AUTO: 43.3 FL (ref 37–54)
EOSINOPHIL # BLD AUTO: 0.2 10*3/MM3 (ref 0–0.4)
EOSINOPHIL NFR BLD AUTO: 2.5 % (ref 0.3–6.2)
ERYTHROCYTE [DISTWIDTH] IN BLOOD BY AUTOMATED COUNT: 15.2 % (ref 12.3–15.4)
GFR SERPL CREATININE-BSD FRML MDRD: 101 ML/MIN/1.73
GLUCOSE BLDC GLUCOMTR-MCNC: 212 MG/DL (ref 70–105)
GLUCOSE SERPL-MCNC: 324 MG/DL (ref 65–99)
GLUCOSE UR STRIP-MCNC: ABNORMAL MG/DL
HCT VFR BLD AUTO: 40.5 % (ref 34–46.6)
HGB BLD-MCNC: 13.1 G/DL (ref 12–15.9)
HGB UR QL STRIP.AUTO: NEGATIVE
HOLD SPECIMEN: NORMAL
KETONES UR QL STRIP: NEGATIVE
LEUKOCYTE ESTERASE UR QL STRIP.AUTO: NEGATIVE
LYMPHOCYTES # BLD AUTO: 2.9 10*3/MM3 (ref 0.7–3.1)
LYMPHOCYTES NFR BLD AUTO: 35.7 % (ref 19.6–45.3)
MCH RBC QN AUTO: 26.4 PG (ref 26.6–33)
MCHC RBC AUTO-ENTMCNC: 32.3 G/DL (ref 31.5–35.7)
MCV RBC AUTO: 81.7 FL (ref 79–97)
MONOCYTES # BLD AUTO: 0.6 10*3/MM3 (ref 0.1–0.9)
MONOCYTES NFR BLD AUTO: 7.8 % (ref 5–12)
NEUTROPHILS NFR BLD AUTO: 4.4 10*3/MM3 (ref 1.7–7)
NEUTROPHILS NFR BLD AUTO: 53.8 % (ref 42.7–76)
NITRITE UR QL STRIP: NEGATIVE
NRBC BLD AUTO-RTO: 0.1 /100 WBC (ref 0–0.2)
PH UR STRIP.AUTO: 6 [PH] (ref 5–8)
PLATELET # BLD AUTO: 320 10*3/MM3 (ref 140–450)
PMV BLD AUTO: 8.2 FL (ref 6–12)
POTASSIUM SERPL-SCNC: 4 MMOL/L (ref 3.5–5.2)
PROT UR QL STRIP: NEGATIVE
RBC # BLD AUTO: 4.96 10*6/MM3 (ref 3.77–5.28)
SODIUM SERPL-SCNC: 139 MMOL/L (ref 136–145)
SP GR UR STRIP: 1.04 (ref 1–1.03)
UROBILINOGEN UR QL STRIP: ABNORMAL
WBC # BLD AUTO: 8.2 10*3/MM3 (ref 3.4–10.8)

## 2021-07-15 PROCEDURE — 25010000002 ONDANSETRON PER 1 MG: Performed by: EMERGENCY MEDICINE

## 2021-07-15 PROCEDURE — 80048 BASIC METABOLIC PNL TOTAL CA: CPT | Performed by: EMERGENCY MEDICINE

## 2021-07-15 PROCEDURE — 96374 THER/PROPH/DIAG INJ IV PUSH: CPT

## 2021-07-15 PROCEDURE — 81003 URINALYSIS AUTO W/O SCOPE: CPT | Performed by: EMERGENCY MEDICINE

## 2021-07-15 PROCEDURE — 82962 GLUCOSE BLOOD TEST: CPT

## 2021-07-15 PROCEDURE — 63710000001 INSULIN REGULAR HUMAN PER 5 UNITS: Performed by: EMERGENCY MEDICINE

## 2021-07-15 PROCEDURE — 85025 COMPLETE CBC W/AUTO DIFF WBC: CPT | Performed by: EMERGENCY MEDICINE

## 2021-07-15 RX ORDER — ONDANSETRON 4 MG/1
4 TABLET, ORALLY DISINTEGRATING ORAL EVERY 8 HOURS PRN
Qty: 12 TABLET | Refills: 0 | Status: ON HOLD | OUTPATIENT
Start: 2021-07-15 | End: 2022-08-04

## 2021-07-15 RX ORDER — SODIUM CHLORIDE 0.9 % (FLUSH) 0.9 %
10 SYRINGE (ML) INJECTION AS NEEDED
Status: DISCONTINUED | OUTPATIENT
Start: 2021-07-15 | End: 2021-07-15 | Stop reason: HOSPADM

## 2021-07-15 RX ORDER — ONDANSETRON 2 MG/ML
4 INJECTION INTRAMUSCULAR; INTRAVENOUS ONCE
Status: COMPLETED | OUTPATIENT
Start: 2021-07-15 | End: 2021-07-15

## 2021-07-15 RX ORDER — PROCHLORPERAZINE EDISYLATE 5 MG/ML
5 INJECTION INTRAMUSCULAR; INTRAVENOUS ONCE
Status: DISCONTINUED | OUTPATIENT
Start: 2021-07-15 | End: 2021-07-15 | Stop reason: HOSPADM

## 2021-07-15 RX ADMIN — SODIUM CHLORIDE 500 ML: 9 INJECTION, SOLUTION INTRAVENOUS at 00:29

## 2021-07-15 RX ADMIN — INSULIN HUMAN 6 UNITS: 100 INJECTION, SOLUTION PARENTERAL at 01:45

## 2021-07-15 RX ADMIN — ONDANSETRON 4 MG: 2 INJECTION INTRAMUSCULAR; INTRAVENOUS at 00:47

## 2021-07-15 NOTE — ED NOTES
Pt reports headache, NVD, and dizziness. Pt reports she was seen here for the same thing last Friday and was dx with DHD and heat exhaustion. Pt reports the same sx began today while she was in the heat at work   Pt denies any urinary sx       Hurt, Renata GUAMAN RN  07/15/21 0025

## 2021-07-15 NOTE — ED PROVIDER NOTES
Subjective   Patient is a 36-year-old female complaining of headache dizziness vomiting diarrhea.  She had a headache and dizziness for the past 1 week.  She was seen in the ED and had a negative evaluation at that time.  She developed vomiting diarrhea today.  She denies fever chest pain dysuria or other complaint.  She states her headache is mild and constant.          Review of Systems  Negative for earache sore throat cough fever chest pain shortness of breath dysuria achiness weight loss or other complaint.  No past medical history on file.    Allergies   Allergen Reactions   • Compazine [Prochlorperazine Edisylate] Mental Status Change   • Reglan [Metoclopramide] Palpitations   • Victoza [Liraglutide] GI Intolerance       No past surgical history on file.    No family history on file.    Social History     Socioeconomic History   • Marital status: Single     Spouse name: Not on file   • Number of children: Not on file   • Years of education: Not on file   • Highest education level: Not on file           Objective   Physical Exam  HEENT exam shows TMs to be clear.  Oropharynx comers but sclerae nonicteric.  Neck has no adenopathy JVD or bruits.  Lungs are clear.  Heart has regular rate rhythm without murmur rub gallop.  Chest is nontender.  Abdomen is soft nontender.  Patient has normal bowel sounds without rebound or guarding.  Back has no CVA tenderness.  Extremity exam is unremarkable.  Neurologic exam is nonfocal.  Procedures           ED Course      Results for orders placed or performed during the hospital encounter of 07/15/21   Basic Metabolic Panel    Specimen: Blood   Result Value Ref Range    Glucose 324 (H) 65 - 99 mg/dL    BUN 10 6 - 20 mg/dL    Creatinine 0.78 0.57 - 1.00 mg/dL    Sodium 139 136 - 145 mmol/L    Potassium 4.0 3.5 - 5.2 mmol/L    Chloride 101 98 - 107 mmol/L    CO2 27.0 22.0 - 29.0 mmol/L    Calcium 9.3 8.6 - 10.5 mg/dL    eGFR  African Amer 101 >60 mL/min/1.73    BUN/Creatinine  Ratio 12.8 7.0 - 25.0    Anion Gap 11.0 5.0 - 15.0 mmol/L   Urinalysis With Microscopic If Indicated (No Culture) - Urine, Clean Catch    Specimen: Urine, Clean Catch   Result Value Ref Range    Color, UA Yellow Yellow, Straw    Appearance, UA Clear Clear    pH, UA 6.0 5.0 - 8.0    Specific Gravity, UA 1.037 (H) 1.005 - 1.030    Glucose, UA >=1000 mg/dL (3+) (A) Negative    Ketones, UA Negative Negative    Bilirubin, UA Negative Negative    Blood, UA Negative Negative    Protein, UA Negative Negative    Leuk Esterase, UA Negative Negative    Nitrite, UA Negative Negative    Urobilinogen, UA 0.2 E.U./dL 0.2 - 1.0 E.U./dL   CBC Auto Differential    Specimen: Blood   Result Value Ref Range    WBC 8.20 3.40 - 10.80 10*3/mm3    RBC 4.96 3.77 - 5.28 10*6/mm3    Hemoglobin 13.1 12.0 - 15.9 g/dL    Hematocrit 40.5 34.0 - 46.6 %    MCV 81.7 79.0 - 97.0 fL    MCH 26.4 (L) 26.6 - 33.0 pg    MCHC 32.3 31.5 - 35.7 g/dL    RDW 15.2 12.3 - 15.4 %    RDW-SD 43.3 37.0 - 54.0 fl    MPV 8.2 6.0 - 12.0 fL    Platelets 320 140 - 450 10*3/mm3    Neutrophil % 53.8 42.7 - 76.0 %    Lymphocyte % 35.7 19.6 - 45.3 %    Monocyte % 7.8 5.0 - 12.0 %    Eosinophil % 2.5 0.3 - 6.2 %    Basophil % 0.2 0.0 - 1.5 %    Neutrophils, Absolute 4.40 1.70 - 7.00 10*3/mm3    Lymphocytes, Absolute 2.90 0.70 - 3.10 10*3/mm3    Monocytes, Absolute 0.60 0.10 - 0.90 10*3/mm3    Eosinophils, Absolute 0.20 0.00 - 0.40 10*3/mm3    Basophils, Absolute 0.00 0.00 - 0.20 10*3/mm3    nRBC 0.1 0.0 - 0.2 /100 WBC                                          MDM  Number of Diagnoses or Management Options  Diagnosis management comments: Patient has benign physical exam.  She has normal vital signs.  Patient has no evidence acute infectious process.  Neurologic exam is nonfocal.  Metabolic panel normal other than hyperglycemia.  Patient states she has been taking her antihyperglycemic's however she has been having her medicines changed recently.  I did review the patient's old  chart and it is noted that she did have a CT scan of her head without contrast 5 days ago which was normal.  Patient was given insulin IV.  She will be discharged to follow with her family physician for further outpatient evaluation.       Amount and/or Complexity of Data Reviewed  Clinical lab tests: reviewed    Risk of Complications, Morbidity, and/or Mortality  Presenting problems: high  Diagnostic procedures: high  Management options: high    Patient Progress  Patient progress: stable      Final diagnoses:   Hyperglycemia   Nausea and vomiting, intractability of vomiting not specified, unspecified vomiting type   Nonintractable headache, unspecified chronicity pattern, unspecified headache type   Dizziness       ED Disposition  ED Disposition     ED Disposition Condition Comment    Discharge Stable           No follow-up provider specified.       Medication List      New Prescriptions    ondansetron ODT 4 MG disintegrating tablet  Commonly known as: ZOFRAN-ODT  Place 1 tablet on the tongue Every 8 (Eight) Hours As Needed for Vomiting.           Where to Get Your Medications      These medications were sent to [facility]/pharmacy #[id number] - [place], [place] - [address] [place] [address] - [phone]  - [phone] [address] [place] [address]    Hours: 24-hours Phone: [phone]   · ondansetron ODT 4 MG disintegrating tablet          Ezequiel Mcgill MD  07/15/21 0124

## 2021-08-24 ENCOUNTER — TELEPHONE (OUTPATIENT)
Dept: SLEEP MEDICINE | Facility: HOSPITAL | Age: 36
End: 2021-08-24

## 2022-04-21 ENCOUNTER — OFFICE (OUTPATIENT)
Dept: URBAN - METROPOLITAN AREA CLINIC 64 | Facility: CLINIC | Age: 37
End: 2022-04-21
Payer: COMMERCIAL

## 2022-04-21 VITALS
DIASTOLIC BLOOD PRESSURE: 84 MMHG | HEART RATE: 80 BPM | SYSTOLIC BLOOD PRESSURE: 134 MMHG | HEIGHT: 63 IN | WEIGHT: 233 LBS

## 2022-04-21 DIAGNOSIS — K21.9 GASTRO-ESOPHAGEAL REFLUX DISEASE WITHOUT ESOPHAGITIS: ICD-10-CM

## 2022-04-21 PROCEDURE — 99204 OFFICE O/P NEW MOD 45 MIN: CPT | Performed by: NURSE PRACTITIONER

## 2022-06-06 ENCOUNTER — HOSPITAL ENCOUNTER (EMERGENCY)
Facility: HOSPITAL | Age: 37
Discharge: HOME | End: 2022-06-07
Attending: EMERGENCY MEDICINE | Admitting: EMERGENCY MEDICINE

## 2022-06-06 ENCOUNTER — APPOINTMENT (OUTPATIENT)
Dept: GENERAL RADIOLOGY | Facility: HOSPITAL | Age: 37
End: 2022-06-06

## 2022-06-06 DIAGNOSIS — S80.12XA CONTUSION OF LEFT LOWER EXTREMITY, INITIAL ENCOUNTER: Primary | ICD-10-CM
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
## 2022-06-06 PROCEDURE — 99282 EMERGENCY DEPT VISIT SF MDM: CPT

## 2022-06-06 PROCEDURE — 73552 X-RAY EXAM OF FEMUR 2/>: CPT

## 2022-06-07 VITALS
OXYGEN SATURATION: 100 % | SYSTOLIC BLOOD PRESSURE: 158 MMHG | BODY MASS INDEX: 39.61 KG/M2 | HEIGHT: 63 IN | WEIGHT: 223.55 LBS | TEMPERATURE: 98 F | HEART RATE: 66 BPM | RESPIRATION RATE: 12 BRPM | DIASTOLIC BLOOD PRESSURE: 62 MMHG

## 2022-06-07 RX ORDER — ACETAMINOPHEN AND CODEINE PHOSPHATE 300; 30 MG/1; MG/1
1 TABLET ORAL EVERY 6 HOURS PRN
Qty: 15 TABLET | Refills: 0 | Status: ON HOLD | OUTPATIENT
Start: 2022-06-07 | End: 2022-08-04

## 2022-06-07 NOTE — ED PROVIDER NOTES
Subjective   Patient is a 37-year-old female who states she slipped and fell.  She complains of pain to her left thigh.  She denies other complaint of injury.  The pain is mild.          Review of Systems  Negative for head pain neck pain chest pain shortness of breath abdominal pain back pain or other complaint of injury  No past medical history on file.    Allergies   Allergen Reactions   • Compazine [Prochlorperazine Edisylate] Mental Status Change   • Reglan [Metoclopramide] Palpitations   • Victoza [Liraglutide] GI Intolerance       No past surgical history on file.    No family history on file.    Social History     Socioeconomic History   • Marital status: Single           Objective   Physical Exam  Extremity exam shows mild pain palpation about her left thigh.  She has full range of motion with pain.  She has 2+ pulses and is neurovascular intact.  Procedures           ED Course                                                 MDM  Number of Diagnoses or Management Options  Diagnosis management comments: My x-ray interpretation of the patient's left femur shows no bony or joint abnormality.  Patient will be discharged with a diagnosis of left leg contusion.  She will be given a prescription for Naprosyn will see MD for recheck.    Risk of Complications, Morbidity, and/or Mortality  Presenting problems: moderate  Diagnostic procedures: moderate  Management options: moderate    Patient Progress  Patient progress: stable      Final diagnoses:   Contusion of left lower extremity, initial encounter   Fall, initial encounter       ED Disposition  ED Disposition     ED Disposition   Discharge    Condition   Stable    Comment   --             No follow-up provider specified.       Medication List      New Prescriptions    acetaminophen-codeine 300-30 MG per tablet  Commonly known as: TYLENOL #3  Take 1 tablet by mouth Every 6 (Six) Hours As Needed for Moderate Pain .           Where to Get Your Medications       Information about where to get these medications is not yet available    Ask your nurse or doctor about these medications  · acetaminophen-codeine 300-30 MG per tablet          Ezequiel Mcgill MD  06/07/22 5066

## 2022-06-30 ENCOUNTER — OFFICE (OUTPATIENT)
Dept: URBAN - METROPOLITAN AREA CLINIC 64 | Facility: CLINIC | Age: 37
End: 2022-06-30
Payer: COMMERCIAL

## 2022-06-30 VITALS
HEART RATE: 99 BPM | DIASTOLIC BLOOD PRESSURE: 82 MMHG | SYSTOLIC BLOOD PRESSURE: 120 MMHG | WEIGHT: 218 LBS | HEIGHT: 63 IN

## 2022-06-30 DIAGNOSIS — R13.10 DYSPHAGIA, UNSPECIFIED: ICD-10-CM

## 2022-06-30 DIAGNOSIS — K21.9 GASTRO-ESOPHAGEAL REFLUX DISEASE WITHOUT ESOPHAGITIS: ICD-10-CM

## 2022-06-30 DIAGNOSIS — R11.2 NAUSEA WITH VOMITING, UNSPECIFIED: ICD-10-CM

## 2022-06-30 PROCEDURE — 99214 OFFICE O/P EST MOD 30 MIN: CPT | Performed by: NURSE PRACTITIONER

## 2022-06-30 RX ORDER — MAGNESIUM CITRATE 1.75 G/29.6ML
LIQUID ORAL
Qty: 11 | Refills: 99 | Status: COMPLETED
Start: 2022-06-30 | End: 2022-10-07

## 2022-06-30 RX ORDER — PRUCALOPRIDE 2 MG/1
TABLET, FILM COATED ORAL
Qty: 90 | Refills: 1 | Status: ACTIVE

## 2022-06-30 RX ORDER — DEXLANSOPRAZOLE 60 MG/1
60 CAPSULE, DELAYED RELEASE ORAL
Qty: 90 | Refills: 3 | Status: ACTIVE
Start: 2022-06-30

## 2022-08-03 ENCOUNTER — HOSPITAL ENCOUNTER (OUTPATIENT)
Facility: HOSPITAL | Age: 37
Setting detail: OBSERVATION
Discharge: HOME | End: 2022-08-04
Attending: EMERGENCY MEDICINE | Admitting: EMERGENCY MEDICINE

## 2022-08-03 DIAGNOSIS — R51.9 ACUTE NONINTRACTABLE HEADACHE, UNSPECIFIED HEADACHE TYPE: ICD-10-CM

## 2022-08-03 DIAGNOSIS — R07.9 CHEST PAIN, UNSPECIFIED TYPE: Primary | ICD-10-CM

## 2022-08-03 PROCEDURE — 93005 ELECTROCARDIOGRAM TRACING: CPT

## 2022-08-03 PROCEDURE — G0378 HOSPITAL OBSERVATION PER HR: HCPCS

## 2022-08-03 PROCEDURE — 80179 DRUG ASSAY SALICYLATE: CPT | Performed by: EMERGENCY MEDICINE

## 2022-08-03 PROCEDURE — 80053 COMPREHEN METABOLIC PANEL: CPT | Performed by: EMERGENCY MEDICINE

## 2022-08-03 PROCEDURE — 85730 THROMBOPLASTIN TIME PARTIAL: CPT | Performed by: EMERGENCY MEDICINE

## 2022-08-03 PROCEDURE — 93005 ELECTROCARDIOGRAM TRACING: CPT | Performed by: EMERGENCY MEDICINE

## 2022-08-03 PROCEDURE — 84703 CHORIONIC GONADOTROPIN ASSAY: CPT | Performed by: EMERGENCY MEDICINE

## 2022-08-03 PROCEDURE — 85610 PROTHROMBIN TIME: CPT | Performed by: EMERGENCY MEDICINE

## 2022-08-03 PROCEDURE — 84484 ASSAY OF TROPONIN QUANT: CPT | Performed by: EMERGENCY MEDICINE

## 2022-08-03 PROCEDURE — 85025 COMPLETE CBC W/AUTO DIFF WBC: CPT | Performed by: EMERGENCY MEDICINE

## 2022-08-03 PROCEDURE — 99284 EMERGENCY DEPT VISIT MOD MDM: CPT

## 2022-08-03 RX ORDER — ONDANSETRON 2 MG/ML
4 INJECTION INTRAMUSCULAR; INTRAVENOUS ONCE
Status: COMPLETED | OUTPATIENT
Start: 2022-08-04 | End: 2022-08-04

## 2022-08-04 ENCOUNTER — APPOINTMENT (OUTPATIENT)
Dept: NUCLEAR MEDICINE | Facility: HOSPITAL | Age: 37
End: 2022-08-04

## 2022-08-04 ENCOUNTER — APPOINTMENT (OUTPATIENT)
Dept: CARDIOLOGY | Facility: HOSPITAL | Age: 37
End: 2022-08-04

## 2022-08-04 ENCOUNTER — APPOINTMENT (OUTPATIENT)
Dept: GENERAL RADIOLOGY | Facility: HOSPITAL | Age: 37
End: 2022-08-04

## 2022-08-04 VITALS
HEART RATE: 86 BPM | DIASTOLIC BLOOD PRESSURE: 83 MMHG | BODY MASS INDEX: 38.95 KG/M2 | SYSTOLIC BLOOD PRESSURE: 121 MMHG | OXYGEN SATURATION: 100 % | TEMPERATURE: 98.1 F | WEIGHT: 219.8 LBS | HEIGHT: 63 IN | RESPIRATION RATE: 18 BRPM

## 2022-08-04 PROBLEM — R07.9 CHEST PAIN: Status: ACTIVE | Noted: 2022-08-04

## 2022-08-04 LAB
ALBUMIN SERPL-MCNC: 4.2 G/DL (ref 3.5–5.2)
ALBUMIN/GLOB SERPL: 1.6 G/DL
ALP SERPL-CCNC: 64 U/L (ref 39–117)
ALT SERPL W P-5'-P-CCNC: 10 U/L (ref 1–33)
ANION GAP SERPL CALCULATED.3IONS-SCNC: 11 MMOL/L (ref 5–15)
APTT PPP: 28.5 SECONDS (ref 61–76.5)
AST SERPL-CCNC: 11 U/L (ref 1–32)
BASOPHILS # BLD AUTO: 0 10*3/MM3 (ref 0–0.2)
BASOPHILS NFR BLD AUTO: 0.6 % (ref 0–1.5)
BH CV ECHO MEAS - ACS: 2.8 CM
BH CV ECHO MEAS - AO MAX PG: 7.4 MMHG
BH CV ECHO MEAS - AO MEAN PG: 4.4 MMHG
BH CV ECHO MEAS - AO ROOT DIAM: 2.07 CM
BH CV ECHO MEAS - AO V2 MAX: 135.9 CM/SEC
BH CV ECHO MEAS - AO V2 VTI: 26.4 CM
BH CV ECHO MEAS - AVA(I,D): 1.84 CM2
BH CV ECHO MEAS - EDV(CUBED): 25.4 ML
BH CV ECHO MEAS - EDV(MOD-SP4): 82 ML
BH CV ECHO MEAS - EF(MOD-BP): 67 %
BH CV ECHO MEAS - EF(MOD-SP4): 66.5 %
BH CV ECHO MEAS - ESV(CUBED): 9.7 ML
BH CV ECHO MEAS - ESV(MOD-SP4): 27.5 ML
BH CV ECHO MEAS - FS: 27.5 %
BH CV ECHO MEAS - IVS/LVPW: 1.04 CM
BH CV ECHO MEAS - IVSD: 1.39 CM
BH CV ECHO MEAS - LA DIMENSION: 3.5 CM
BH CV ECHO MEAS - LV MASS(C)D: 131.3 GRAMS
BH CV ECHO MEAS - LV MAX PG: 2.27 MMHG
BH CV ECHO MEAS - LV MEAN PG: 1.36 MMHG
BH CV ECHO MEAS - LV V1 MAX: 75.4 CM/SEC
BH CV ECHO MEAS - LV V1 VTI: 14.1 CM
BH CV ECHO MEAS - LVIDD: 2.9 CM
BH CV ECHO MEAS - LVIDS: 2.13 CM
BH CV ECHO MEAS - LVOT AREA: 3.4 CM2
BH CV ECHO MEAS - LVOT DIAM: 2.09 CM
BH CV ECHO MEAS - LVPWD: 1.34 CM
BH CV ECHO MEAS - MR MAX PG: 26 MMHG
BH CV ECHO MEAS - MR MAX VEL: 255.1 CM/SEC
BH CV ECHO MEAS - MV A MAX VEL: 90.1 CM/SEC
BH CV ECHO MEAS - MV DEC SLOPE: 172.8 CM/SEC2
BH CV ECHO MEAS - MV DEC TIME: 0.21 MSEC
BH CV ECHO MEAS - MV E MAX VEL: 36.8 CM/SEC
BH CV ECHO MEAS - MV E/A: 0.41
BH CV ECHO MEAS - MV MAX PG: 4 MMHG
BH CV ECHO MEAS - MV MEAN PG: 1.76 MMHG
BH CV ECHO MEAS - MV V2 VTI: 28.2 CM
BH CV ECHO MEAS - MVA(VTI): 1.72 CM2
BH CV ECHO MEAS - PA V2 MAX: 112.9 CM/SEC
BH CV ECHO MEAS - RAP SYSTOLE: 3 MMHG
BH CV ECHO MEAS - RV MAX PG: 1.64 MMHG
BH CV ECHO MEAS - RV V1 MAX: 64.1 CM/SEC
BH CV ECHO MEAS - RV V1 VTI: 11 CM
BH CV ECHO MEAS - RVDD: 3.1 CM
BH CV ECHO MEAS - RVSP: 22.3 MMHG
BH CV ECHO MEAS - SV(LVOT): 48.5 ML
BH CV ECHO MEAS - SV(MOD-SP4): 54.6 ML
BH CV ECHO MEAS - TR MAX PG: 19.3 MMHG
BH CV ECHO MEAS - TR MAX VEL: 219.8 CM/SEC
BH CV REST NUCLEAR ISOTOPE DOSE: 10.7 MCI
BH CV STRESS BP STAGE 1: NORMAL
BH CV STRESS BP STAGE 2: NORMAL
BH CV STRESS COMMENTS STAGE 1: NORMAL
BH CV STRESS COMMENTS STAGE 2: NORMAL
BH CV STRESS DOSE REGADENOSON STAGE 1: 0.4
BH CV STRESS DURATION MIN STAGE 1: 0
BH CV STRESS DURATION MIN STAGE 2: 4
BH CV STRESS DURATION SEC STAGE 1: 10
BH CV STRESS DURATION SEC STAGE 2: 0
BH CV STRESS HR STAGE 1: 118
BH CV STRESS HR STAGE 2: 103
BH CV STRESS NUCLEAR ISOTOPE DOSE: 32.7 MCI
BH CV STRESS PROTOCOL 1: NORMAL
BH CV STRESS RECOVERY BP: NORMAL MMHG
BH CV STRESS RECOVERY HR: 103 BPM
BH CV STRESS STAGE 1: 1
BH CV STRESS STAGE 2: 2
BILIRUB SERPL-MCNC: 0.3 MG/DL (ref 0–1.2)
BUN SERPL-MCNC: 8 MG/DL (ref 6–20)
BUN/CREAT SERPL: 9.2 (ref 7–25)
CALCIUM SPEC-SCNC: 9.2 MG/DL (ref 8.6–10.5)
CHLORIDE SERPL-SCNC: 103 MMOL/L (ref 98–107)
CO2 SERPL-SCNC: 28 MMOL/L (ref 22–29)
CREAT SERPL-MCNC: 0.87 MG/DL (ref 0.57–1)
DEPRECATED RDW RBC AUTO: 39.8 FL (ref 37–54)
EGFRCR SERPLBLD CKD-EPI 2021: 88.1 ML/MIN/1.73
EOSINOPHIL # BLD AUTO: 0.2 10*3/MM3 (ref 0–0.4)
EOSINOPHIL NFR BLD AUTO: 2.8 % (ref 0.3–6.2)
ERYTHROCYTE [DISTWIDTH] IN BLOOD BY AUTOMATED COUNT: 13.9 % (ref 12.3–15.4)
GLOBULIN UR ELPH-MCNC: 2.7 GM/DL
GLUCOSE BLDC GLUCOMTR-MCNC: 144 MG/DL (ref 70–105)
GLUCOSE BLDC GLUCOMTR-MCNC: 161 MG/DL (ref 70–105)
GLUCOSE SERPL-MCNC: 133 MG/DL (ref 65–99)
HCG SERPL QL: NEGATIVE
HCT VFR BLD AUTO: 38.6 % (ref 34–46.6)
HGB BLD-MCNC: 12.7 G/DL (ref 12–15.9)
HOLD SPECIMEN: NORMAL
HOLD SPECIMEN: NORMAL
INR PPP: 0.99 (ref 0.93–1.1)
LV EF 2D ECHO EST: 65 %
LV EF NUC BP: 56 %
LYMPHOCYTES # BLD AUTO: 3.3 10*3/MM3 (ref 0.7–3.1)
LYMPHOCYTES NFR BLD AUTO: 39.8 % (ref 19.6–45.3)
MAGNESIUM SERPL-MCNC: 1.7 MG/DL (ref 1.6–2.6)
MAXIMAL PREDICTED HEART RATE: 183 BPM
MAXIMAL PREDICTED HEART RATE: 183 BPM
MCH RBC QN AUTO: 26.6 PG (ref 26.6–33)
MCHC RBC AUTO-ENTMCNC: 32.8 G/DL (ref 31.5–35.7)
MCV RBC AUTO: 81.2 FL (ref 79–97)
MONOCYTES # BLD AUTO: 0.6 10*3/MM3 (ref 0.1–0.9)
MONOCYTES NFR BLD AUTO: 7.7 % (ref 5–12)
NEUTROPHILS NFR BLD AUTO: 4.1 10*3/MM3 (ref 1.7–7)
NEUTROPHILS NFR BLD AUTO: 49.1 % (ref 42.7–76)
NRBC BLD AUTO-RTO: 0.2 /100 WBC (ref 0–0.2)
PERCENT MAX PREDICTED HR: 64.48 %
PLATELET # BLD AUTO: 308 10*3/MM3 (ref 140–450)
PMV BLD AUTO: 7.8 FL (ref 6–12)
POTASSIUM SERPL-SCNC: 3.3 MMOL/L (ref 3.5–5.2)
PROT SERPL-MCNC: 6.9 G/DL (ref 6–8.5)
PROTHROMBIN TIME: 10.2 SECONDS (ref 9.6–11.7)
RBC # BLD AUTO: 4.76 10*6/MM3 (ref 3.77–5.28)
SALICYLATES SERPL-MCNC: <0.3 MG/DL
SARS-COV-2 RNA PNL SPEC NAA+PROBE: NOT DETECTED
SODIUM SERPL-SCNC: 142 MMOL/L (ref 136–145)
STRESS BASELINE BP: NORMAL MMHG
STRESS BASELINE HR: 92 BPM
STRESS PERCENT HR: 76 %
STRESS POST PEAK BP: NORMAL MMHG
STRESS POST PEAK HR: 118 BPM
STRESS TARGET HR: 156 BPM
STRESS TARGET HR: 156 BPM
TROPONIN T SERPL-MCNC: <0.01 NG/ML (ref 0–0.03)
TROPONIN T SERPL-MCNC: <0.01 NG/ML (ref 0–0.03)
TSH SERPL DL<=0.05 MIU/L-ACNC: 0.9 UIU/ML (ref 0.27–4.2)
WBC NRBC COR # BLD: 8.3 10*3/MM3 (ref 3.4–10.8)
WHOLE BLOOD HOLD COAG: NORMAL
WHOLE BLOOD HOLD SPECIMEN: NORMAL

## 2022-08-04 PROCEDURE — 93018 CV STRESS TEST I&R ONLY: CPT | Performed by: INTERNAL MEDICINE

## 2022-08-04 PROCEDURE — G0378 HOSPITAL OBSERVATION PER HR: HCPCS

## 2022-08-04 PROCEDURE — 87635 SARS-COV-2 COVID-19 AMP PRB: CPT | Performed by: EMERGENCY MEDICINE

## 2022-08-04 PROCEDURE — 71045 X-RAY EXAM CHEST 1 VIEW: CPT

## 2022-08-04 PROCEDURE — 93306 TTE W/DOPPLER COMPLETE: CPT

## 2022-08-04 PROCEDURE — 82962 GLUCOSE BLOOD TEST: CPT

## 2022-08-04 PROCEDURE — 0 TECHNETIUM TETROFOSMIN KIT: Performed by: EMERGENCY MEDICINE

## 2022-08-04 PROCEDURE — 93017 CV STRESS TEST TRACING ONLY: CPT

## 2022-08-04 PROCEDURE — 78452 HT MUSCLE IMAGE SPECT MULT: CPT

## 2022-08-04 PROCEDURE — 83735 ASSAY OF MAGNESIUM: CPT | Performed by: PHYSICIAN ASSISTANT

## 2022-08-04 PROCEDURE — 36415 COLL VENOUS BLD VENIPUNCTURE: CPT | Performed by: EMERGENCY MEDICINE

## 2022-08-04 PROCEDURE — 84443 ASSAY THYROID STIM HORMONE: CPT | Performed by: PHYSICIAN ASSISTANT

## 2022-08-04 PROCEDURE — 84484 ASSAY OF TROPONIN QUANT: CPT | Performed by: EMERGENCY MEDICINE

## 2022-08-04 PROCEDURE — 25010000002 ONDANSETRON PER 1 MG: Performed by: EMERGENCY MEDICINE

## 2022-08-04 PROCEDURE — 93306 TTE W/DOPPLER COMPLETE: CPT | Performed by: INTERNAL MEDICINE

## 2022-08-04 PROCEDURE — 25010000002 REGADENOSON 0.4 MG/5ML SOLUTION: Performed by: EMERGENCY MEDICINE

## 2022-08-04 PROCEDURE — 78452 HT MUSCLE IMAGE SPECT MULT: CPT | Performed by: INTERNAL MEDICINE

## 2022-08-04 PROCEDURE — A9502 TC99M TETROFOSMIN: HCPCS | Performed by: EMERGENCY MEDICINE

## 2022-08-04 PROCEDURE — 96374 THER/PROPH/DIAG INJ IV PUSH: CPT

## 2022-08-04 PROCEDURE — 96361 HYDRATE IV INFUSION ADD-ON: CPT

## 2022-08-04 PROCEDURE — 63710000001 INSULIN LISPRO (HUMAN) PER 5 UNITS: Performed by: PHYSICIAN ASSISTANT

## 2022-08-04 PROCEDURE — 99203 OFFICE O/P NEW LOW 30 MIN: CPT | Performed by: INTERNAL MEDICINE

## 2022-08-04 RX ORDER — LOPERAMIDE HYDROCHLORIDE 2 MG/1
2 CAPSULE ORAL 4 TIMES DAILY PRN
Status: DISCONTINUED | OUTPATIENT
Start: 2022-08-04 | End: 2022-08-04 | Stop reason: HOSPADM

## 2022-08-04 RX ORDER — CHOLECALCIFEROL (VITAMIN D3) 125 MCG
5 CAPSULE ORAL NIGHTLY PRN
Status: DISCONTINUED | OUTPATIENT
Start: 2022-08-04 | End: 2022-08-04 | Stop reason: HOSPADM

## 2022-08-04 RX ORDER — OLANZAPINE 10 MG/2ML
1 INJECTION, POWDER, LYOPHILIZED, FOR SOLUTION INTRAMUSCULAR
Status: DISCONTINUED | OUTPATIENT
Start: 2022-08-04 | End: 2022-08-04 | Stop reason: HOSPADM

## 2022-08-04 RX ORDER — BLOOD-GLUCOSE METER
1 KIT MISCELLANEOUS ONCE
Qty: 1 KIT | Refills: 0 | Status: SHIPPED | OUTPATIENT
Start: 2022-08-04 | End: 2022-08-04

## 2022-08-04 RX ORDER — MECLIZINE HYDROCHLORIDE 25 MG/1
25 TABLET ORAL 2 TIMES DAILY
COMMUNITY
End: 2023-01-18

## 2022-08-04 RX ORDER — DEXLANSOPRAZOLE 60 MG/1
60 CAPSULE, DELAYED RELEASE ORAL DAILY
COMMUNITY

## 2022-08-04 RX ORDER — ONDANSETRON 4 MG/1
4 TABLET, FILM COATED ORAL EVERY 6 HOURS PRN
Status: DISCONTINUED | OUTPATIENT
Start: 2022-08-04 | End: 2022-08-04 | Stop reason: HOSPADM

## 2022-08-04 RX ORDER — BUTALBITAL, ACETAMINOPHEN AND CAFFEINE 50; 325; 40 MG/1; MG/1; MG/1
1 TABLET ORAL ONCE AS NEEDED
Status: COMPLETED | OUTPATIENT
Start: 2022-08-04 | End: 2022-08-04

## 2022-08-04 RX ORDER — ASPIRIN 81 MG/1
81 TABLET ORAL DAILY
Qty: 30 TABLET | Refills: 0 | Status: SHIPPED | OUTPATIENT
Start: 2022-08-04 | End: 2022-09-03

## 2022-08-04 RX ORDER — SODIUM CHLORIDE 0.9 % (FLUSH) 0.9 %
10 SYRINGE (ML) INJECTION AS NEEDED
Status: DISCONTINUED | OUTPATIENT
Start: 2022-08-04 | End: 2022-08-04 | Stop reason: HOSPADM

## 2022-08-04 RX ORDER — ONDANSETRON 2 MG/ML
4 INJECTION INTRAMUSCULAR; INTRAVENOUS EVERY 6 HOURS PRN
Status: DISCONTINUED | OUTPATIENT
Start: 2022-08-04 | End: 2022-08-04 | Stop reason: HOSPADM

## 2022-08-04 RX ORDER — DEXTROSE MONOHYDRATE 25 G/50ML
25 INJECTION, SOLUTION INTRAVENOUS
Status: DISCONTINUED | OUTPATIENT
Start: 2022-08-04 | End: 2022-08-04 | Stop reason: HOSPADM

## 2022-08-04 RX ORDER — BLOOD-GLUCOSE METER
1 KIT MISCELLANEOUS DAILY
Qty: 50 EACH | Refills: 2 | Status: SHIPPED | OUTPATIENT
Start: 2022-08-04

## 2022-08-04 RX ORDER — ENOXAPARIN SODIUM 100 MG/ML
40 INJECTION SUBCUTANEOUS EVERY 24 HOURS
Status: DISCONTINUED | OUTPATIENT
Start: 2022-08-04 | End: 2022-08-04 | Stop reason: HOSPADM

## 2022-08-04 RX ORDER — SODIUM CHLORIDE 0.9 % (FLUSH) 0.9 %
10 SYRINGE (ML) INJECTION EVERY 12 HOURS SCHEDULED
Status: DISCONTINUED | OUTPATIENT
Start: 2022-08-04 | End: 2022-08-04 | Stop reason: HOSPADM

## 2022-08-04 RX ORDER — LISINOPRIL AND HYDROCHLOROTHIAZIDE 20; 12.5 MG/1; MG/1
2 TABLET ORAL DAILY
COMMUNITY

## 2022-08-04 RX ORDER — PRUCALOPRIDE 2 MG/1
2 TABLET, FILM COATED ORAL DAILY
COMMUNITY

## 2022-08-04 RX ORDER — INSULIN LISPRO 100 [IU]/ML
0-9 INJECTION, SOLUTION INTRAVENOUS; SUBCUTANEOUS
Status: DISCONTINUED | OUTPATIENT
Start: 2022-08-04 | End: 2022-08-04 | Stop reason: HOSPADM

## 2022-08-04 RX ORDER — PANTOPRAZOLE SODIUM 40 MG/1
40 TABLET, DELAYED RELEASE ORAL
Status: DISCONTINUED | OUTPATIENT
Start: 2022-08-04 | End: 2022-08-04 | Stop reason: HOSPADM

## 2022-08-04 RX ORDER — POTASSIUM CHLORIDE 20 MEQ/1
40 TABLET, EXTENDED RELEASE ORAL ONCE
Status: COMPLETED | OUTPATIENT
Start: 2022-08-04 | End: 2022-08-04

## 2022-08-04 RX ORDER — ATORVASTATIN CALCIUM 20 MG/1
20 TABLET, FILM COATED ORAL NIGHTLY
Status: DISCONTINUED | OUTPATIENT
Start: 2022-08-04 | End: 2022-08-04 | Stop reason: HOSPADM

## 2022-08-04 RX ORDER — NITROGLYCERIN 0.4 MG/1
0.4 TABLET SUBLINGUAL
Status: DISCONTINUED | OUTPATIENT
Start: 2022-08-04 | End: 2022-08-04 | Stop reason: HOSPADM

## 2022-08-04 RX ORDER — ATORVASTATIN CALCIUM 20 MG/1
20 TABLET, FILM COATED ORAL NIGHTLY
Qty: 90 TABLET | Refills: 0 | Status: SHIPPED | OUTPATIENT
Start: 2022-08-04

## 2022-08-04 RX ORDER — ASPIRIN 81 MG/1
81 TABLET ORAL DAILY
Status: DISCONTINUED | OUTPATIENT
Start: 2022-08-04 | End: 2022-08-04 | Stop reason: HOSPADM

## 2022-08-04 RX ORDER — LANCETS 28 GAUGE
1 EACH MISCELLANEOUS DAILY
Qty: 100 EACH | Refills: 2 | Status: SHIPPED | OUTPATIENT
Start: 2022-08-04

## 2022-08-04 RX ORDER — NICOTINE POLACRILEX 4 MG
15 LOZENGE BUCCAL
Status: DISCONTINUED | OUTPATIENT
Start: 2022-08-04 | End: 2022-08-04 | Stop reason: HOSPADM

## 2022-08-04 RX ADMIN — EMPAGLIFLOZIN 25 MG: 25 TABLET, FILM COATED ORAL at 10:41

## 2022-08-04 RX ADMIN — SODIUM CHLORIDE 1000 ML: 9 INJECTION, SOLUTION INTRAVENOUS at 00:17

## 2022-08-04 RX ADMIN — INSULIN LISPRO 2 UNITS: 100 INJECTION, SOLUTION INTRAVENOUS; SUBCUTANEOUS at 11:43

## 2022-08-04 RX ADMIN — HYDROCHLOROTHIAZIDE: 12.5 TABLET ORAL at 10:42

## 2022-08-04 RX ADMIN — REGADENOSON 0.4 MG: 0.08 INJECTION, SOLUTION INTRAVENOUS at 08:59

## 2022-08-04 RX ADMIN — TETROFOSMIN 1 DOSE: 1.38 INJECTION, POWDER, LYOPHILIZED, FOR SOLUTION INTRAVENOUS at 07:57

## 2022-08-04 RX ADMIN — BUTALBITAL, ACETAMINOPHEN, AND CAFFEINE 1 TABLET: 50; 325; 40 TABLET ORAL at 10:41

## 2022-08-04 RX ADMIN — ONDANSETRON 4 MG: 2 INJECTION INTRAMUSCULAR; INTRAVENOUS at 00:18

## 2022-08-04 RX ADMIN — LOPERAMIDE HYDROCHLORIDE 2 MG: 2 CAPSULE ORAL at 10:41

## 2022-08-04 RX ADMIN — TETROFOSMIN 1 DOSE: 1.38 INJECTION, POWDER, LYOPHILIZED, FOR SOLUTION INTRAVENOUS at 08:59

## 2022-08-04 RX ADMIN — POTASSIUM CHLORIDE 40 MEQ: 1500 TABLET, EXTENDED RELEASE ORAL at 00:42

## 2022-08-04 RX ADMIN — PANTOPRAZOLE SODIUM 40 MG: 40 TABLET, DELAYED RELEASE ORAL at 10:42

## 2022-08-04 NOTE — CONSULTS
Referring Provider: Dr. Abner Hernandez   Reason for Consultation: Chest pain    Chief complaint chest pain    Cardiology assessment and plan    Chest pain  Normal LV systolic function  Low risk stress test  Diabetes mellitus  Headache  Uncontrolled hypertension  Obesity/Body mass index is 38.94 kg/m².     Twelve-lead EKG shows sinus rhythm with left bundle branch block  T-max is 97.9 pulse is 85 respirations are 16 blood pressure is 140/94 sats are 97%  Normal troponin  Normal thyroid function  Sodium is 142 potassium is 3.3 creatinine is 1.8 LFTs are normal  White cell count is 8.3 hemoglobin is 12.7  Chest x-ray with no pulmonary infiltrate    Test results reviewed and discussed with patient  Options for further invasive work-up reviewed and discussed with patient  She likes to try medical therapy at this time  Plans to start patient on aspirin statin beta-blockers  Follow-up in office in 2 weeks  If she has recurrent or worsening symptoms will consider further invasive work-up  Need for close monitoring and follow-up reviewed and discussed the patient    Interpretation Summary    · Myocardial perfusion study with no significant reversible ischemia  · Myocardial perfusion study shows small area of mild intensity mostly fixed perfusion defect involving the anteroseptal and anteroapical wall likely secondary to underlying conduction defect and left bundle branch block  · Left ventricular ejection fraction is normal. (Calculated EF = 56%).  · Impressions are consistent with a low risk study.  · Clinical correlation is recommended           Interpretation Summary    · Left ventricular wall thickness is consistent with mild concentric hypertrophy.  · Estimated left ventricular EF = 65% Estimated left ventricular EF was in agreement with the calculated left ventricular EF. Left ventricular systolic function is normal.  · Left ventricular diastolic function is consistent with (grade I) impaired relaxation.  · Estimated  right ventricular systolic pressure from tricuspid regurgitation is normal (<35 mmHg).           History of present illness:  Lacie Rivera is a 37 y.o. female who presents with past medical history that is significant for history of diabetes history of hypertension presented with symptoms of some substernal chest pain  Patient is a poor historian  Complaining of some substernal chest pain with no propagating factors intermittent symptoms.   Also complaining of fatigue weakness headaches dizziness    Prior stress test was NORMAL per patient  Denies any orthopnea PND no weight gain no lower extremity edema  No syncope  Multiple nonspecific symptoms          Review of Systems  Review of Systems   Constitutional: Positive for malaise/fatigue. Negative for chills and decreased appetite.   HENT: Positive for nosebleeds. Negative for congestion.    Eyes: Negative for blurred vision and double vision.   Cardiovascular: Positive for chest pain and dyspnea on exertion. Negative for irregular heartbeat, leg swelling, near-syncope, orthopnea, palpitations and syncope.   Respiratory: Negative for cough and shortness of breath.    Hematologic/Lymphatic: Negative for adenopathy. Does not bruise/bleed easily.   Skin: Negative for color change and rash.   Gastrointestinal: Negative for bloating, abdominal pain, hematemesis and hematochezia.   Genitourinary: Negative for flank pain and hematuria.   Neurological: Positive for dizziness. Negative for focal weakness.   Psychiatric/Behavioral: Negative for altered mental status, hallucinations and memory loss. The patient does not have insomnia.        Past Medical History  Past Medical History:   Diagnosis Date   • Diabetes mellitus (HCC)    • GERD (gastroesophageal reflux disease)    • Hypertension    • Sleep apnea     and History reviewed. No pertinent surgical history.    Family History  History reviewed. No pertinent family history.    Social History  Social History  "    Socioeconomic History   • Marital status: Single   Tobacco Use   • Smoking status: Current Every Day Smoker     Packs/day: 1.00     Types: Cigarettes   Substance and Sexual Activity   • Alcohol use: Not Currently   • Drug use: Never       Objective     Physical Exam:  Constitutional:       Appearance: Well-developed.   Eyes:      Conjunctiva/sclera: Conjunctivae normal.      Pupils: Pupils are equal, round, and reactive to light.   HENT:      Head: Normocephalic and atraumatic.   Neck:      Thyroid: No thyromegaly.   Pulmonary:      Effort: Pulmonary effort is normal.      Breath sounds: Normal breath sounds.   Cardiovascular:      Normal rate. Regular rhythm.   Pulses:     Intact distal pulses.   Abdominal:      General: Bowel sounds are normal.      Palpations: Abdomen is soft.   Musculoskeletal:      Cervical back: Normal range of motion and neck supple. Skin:     General: Skin is warm.   Neurological:      Mental Status: Alert and oriented to person, place, and time.         Vital Signs  Vitals:    08/04/22 0031 08/04/22 0122 08/04/22 0545 08/04/22 1048   BP: 123/75 123/84 111/73 140/94   BP Location:  Left arm Left arm Other (Comment)   Patient Position:  Lying Lying Lying   Pulse: 93 93 92 85   Resp:  18 18 17   Temp:  97.7 °F (36.5 °C) 98 °F (36.7 °C) 97.9 °F (36.6 °C)   TempSrc:  Oral Oral Oral   SpO2:  97% 98% 97%   Weight:  99.7 kg (219 lb 12.8 oz)     Height:  160 cm (63\")         Weight  Flowsheet Rows    Flowsheet Row First Filed Value   Admission Height 160 cm (63\") Documented at 08/03/2022 2241   Admission Weight 91.2 kg (201 lb) Documented at 08/03/2022 2241              Results Review:  Lab Results (last 24 hours)     Procedure Component Value Units Date/Time    TSH [[id number]]  (Normal) Collected: 08/04/22 0353    Specimen: Blood Updated: 08/04/22 1148     TSH 0.895 uIU/mL     Magnesium [[id number]]  (Normal) Collected: 08/04/22 0353    Specimen: Blood Updated: 08/04/22 1136     Magnesium 1.7 " mg/dL     POC Glucose Once [[id number]]  (Abnormal) Collected: 08/04/22 1115    Specimen: Blood Updated: 08/04/22 1118     Glucose 161 mg/dL      Comment: Serial Number: [id number]:  [id number]       POC Glucose Once [[id number]]  (Abnormal) Collected: 08/04/22 0711    Specimen: Blood Updated: 08/04/22 0712     Glucose 144 mg/dL      Comment: Serial Number: [id number]:  [id number]       Troponin [[id number]]  (Normal) Collected: 08/04/22 0353    Specimen: Blood Updated: 08/04/22 0518     Troponin T <0.010 ng/mL     Narrative:      Troponin T Reference Range:  <= 0.03 ng/mL-   Negative for AMI  >0.03 ng/mL-     Abnormal for myocardial necrosis.  Clinicians would have to utilize clinical acumen, EKG, Troponin and serial changes to determine if it is an Acute Myocardial Infarction or myocardial injury due to an underlying chronic condition.       Results may be falsely decreased if patient taking Biotin.      [place] Draw [[id number]] Collected: 08/03/22 2340    Specimen: Blood Updated: 08/04/22 0048    Narrative:      The following orders were created for panel order [place] Draw.  Procedure                               Abnormality         Status                     ---------                               -----------         ------                     Green Top (Gel)[[id number]]                                  Final result               Lavender Top[[id number]]                                     Final result               Gold Top - SST[[id number]]                                   Final result               Light Blue Top[[id number]]                                   Final result                 Please view results for these tests on the individual orders.    Lavender Top [[id number]] Collected: 08/03/22 2340    Specimen: Blood Updated: 08/04/22 0048     Extra Tube hold for add-on     Comment: Auto resulted       Light Blue Top [[id number]] Collected: 08/03/22 2340    Specimen: Blood Updated: 08/04/22 0048      Extra Tube Hold for add-ons.     Comment: Auto resulted       COVID-19,CEPHEID/ALIN,COR/JANICE/PAD/JAIR IN-HOUSE(OR EMERGENT/ADD-ON),NP SWAB IN TRANSPORT MEDIA 3-4 HR TAT, RT-PCR - Swab, Nasopharynx [[id number]]  (Normal) Collected: 08/04/22 0020    Specimen: Swab from Nasopharynx Updated: 08/04/22 0044     COVID19 Not Detected    Narrative:      Fact sheet for providers: https://www.fda.gov/media/102292/download     Fact sheet for patients: https://www.fda.gov/media/466364/download  Fact sheet for providers: https://www.fda.gov/media/828217/download    Fact sheet for patients: https://www.fda.gov/media/949736/download    Test performed by PCR.    hCG, Serum, Qualitative [[id number]]  (Normal) Collected: 08/03/22 2340    Specimen: Blood Updated: 08/04/22 0032     HCG Qualitative Negative    Protime-INR [[id number]]  (Normal) Collected: 08/03/22 2340    Specimen: Blood Updated: 08/04/22 0028     Protime 10.2 Seconds      INR 0.99    aPTT [[id number]]  (Abnormal) Collected: 08/03/22 2340    Specimen: Blood Updated: 08/04/22 0028     PTT 28.5 seconds     Gold Top - SST [[id number]] Collected: 08/03/22 2340    Specimen: Blood Updated: 08/04/22 0024     Extra Tube      Green Top (Gel) [[id number]] Collected: 08/03/22 2340    Specimen: Blood Updated: 08/04/22 0024     Extra Tube      Comprehensive Metabolic Panel [[id number]]  (Abnormal) Collected: 08/03/22 2340    Specimen: Blood Updated: 08/04/22 0018     Glucose 133 mg/dL      BUN 8 mg/dL      Creatinine 0.87 mg/dL      Sodium 142 mmol/L      Potassium 3.3 mmol/L      Chloride 103 mmol/L      CO2 28.0 mmol/L      Calcium 9.2 mg/dL      Total Protein 6.9 g/dL      Albumin 4.20 g/dL      ALT (SGPT) 10 U/L      AST (SGOT) 11 U/L      Alkaline Phosphatase 64 U/L      Total Bilirubin 0.3 mg/dL      Globulin 2.7 gm/dL      A/G Ratio 1.6 g/dL      BUN/Creatinine Ratio 9.2     Anion Gap 11.0 mmol/L      eGFR 88.1 mL/min/1.73      Comment: National Kidney Foundation and American Society  of Nephrology (ASN) Task Force recommended calculation based on the Chronic Kidney Disease Epidemiology Collaboration (CKD-EPI) equation refit without adjustment for race.       Narrative:      GFR Normal >60  Chronic Kidney Disease <60  Kidney Failure <15      Salicylate Level [[id number]]  (Normal) Collected: 08/03/22 2340    Specimen: Blood Updated: 08/04/22 0018     Salicylate <0.3 mg/dL     Troponin [[id number]]  (Normal) Collected: 08/03/22 2340    Specimen: Blood Updated: 08/04/22 0018     Troponin T <0.010 ng/mL     Narrative:      Troponin T Reference Range:  <= 0.03 ng/mL-   Negative for AMI  >0.03 ng/mL-     Abnormal for myocardial necrosis.  Clinicians would have to utilize clinical acumen, EKG, Troponin and serial changes to determine if it is an Acute Myocardial Infarction or myocardial injury due to an underlying chronic condition.       Results may be falsely decreased if patient taking Biotin.      CBC & Differential [[id number]]  (Abnormal) Collected: 08/03/22 2340    Specimen: Blood Updated: 08/04/22 0009    Narrative:      The following orders were created for panel order CBC & Differential.  Procedure                               Abnormality         Status                     ---------                               -----------         ------                     CBC Auto Differential[[id number]]        Abnormal            Final result                 Please view results for these tests on the individual orders.    CBC Auto Differential [[id number]]  (Abnormal) Collected: 08/03/22 2340    Specimen: Blood Updated: 08/04/22 0009     WBC 8.30 10*3/mm3      RBC 4.76 10*6/mm3      Hemoglobin 12.7 g/dL      Hematocrit 38.6 %      MCV 81.2 fL      MCH 26.6 pg      MCHC 32.8 g/dL      RDW 13.9 %      RDW-SD 39.8 fl      MPV 7.8 fL      Platelets 308 10*3/mm3      Neutrophil % 49.1 %      Lymphocyte % 39.8 %      Monocyte % 7.7 %      Eosinophil % 2.8 %      Basophil % 0.6 %      Neutrophils, Absolute 4.10  10*3/mm3      Lymphocytes, Absolute 3.30 10*3/mm3      Monocytes, Absolute 0.60 10*3/mm3      Eosinophils, Absolute 0.20 10*3/mm3      Basophils, Absolute 0.00 10*3/mm3      nRBC 0.2 /100 WBC         Imaging Results (Last 72 Hours)     Procedure Component Value Units Date/Time    XR Chest 1 View [[id number]] Collected: 08/04/22 0815     Updated: 08/04/22 0819    Narrative:         DATE OF EXAM:   8/4/2022 12:01 AM     PROCEDURE:   XR CHEST 1 VW-     INDICATIONS:   cp     COMPARISON:  No Comparisons Available     TECHNIQUE:   Portable Chest     FINDINGS:      Study is limited secondary to overlying support and monitoring apparatus     Lung volumes are low. The heart and mediastinal contours are within  normal limits for technique. There are some vascular crowding at the  lung bases. The lungs are otherwise grossly clear. Osseous structures  are unremarkable in appearance.        Impression:      IMPRESSION :   Negative low lung volume portable chest[     Electronically Signed By-Grzegorz Radford On:8/4/2022 8:16 AM  This report was finalized on [id number] by  Grzegorz Radford, .          Results for orders placed during the hospital encounter of 08/03/22    Adult Transthoracic Echo Complete W/ Cont if Necessary Per Protocol    Interpretation Summary  · Left ventricular wall thickness is consistent with mild concentric hypertrophy.  · Estimated left ventricular EF = 65% Estimated left ventricular EF was in agreement with the calculated left ventricular EF. Left ventricular systolic function is normal.  · Left ventricular diastolic function is consistent with (grade I) impaired relaxation.  · Estimated right ventricular systolic pressure from tricuspid regurgitation is normal (<35 mmHg).      Medication Review  Scheduled Meds:empagliflozin, 25 mg, Oral, Daily  enoxaparin, 40 mg, Subcutaneous, Q24H  insulin lispro, 0-9 Units, Subcutaneous, TID AC  lisinopril-hydroCHLOROthiazide (ZESTORTIC) 20-12.5 mg combo dose, ,  Oral, Q24H  pantoprazole, 40 mg, Oral, BID AC  sodium chloride, 10 mL, Intravenous, Q12H      Continuous Infusions:   PRN Meds:.•  dextrose  •  dextrose  •  glucagon (human recombinant)  •  loperamide  •  melatonin  •  nitroglycerin  •  ondansetron **OR** ondansetron  •  sodium chloride    Assessment & Plan       Chest pain      Patient Active Problem List   Diagnosis   • Chest pain           Jania Schwartz MD  08/04/22  11:57 EDT

## 2022-08-04 NOTE — CASE MANAGEMENT/SOCIAL WORK
Discharge Planning Assessment  [facility]     Patient Name: Lacie Rivera  MRN: [id number]  Today's Date: 8/4/2022    Admit Date: 8/3/2022     Discharge Needs Assessment     Row Name 08/04/22 1338       Living Environment    People in Home significant other    Name(s) of People in Home Girlfriend Lilli and children ages 12 and 13    Current Living Arrangements home    Primary Care Provided by self    Provides Primary Care For child(kayla)    Family Caregiver if Needed significant other    Family Caregiver Names Lilli    Quality of Family Relationships supportive;helpful    Able to Return to Prior Arrangements yes       Resource/Environmental Concerns    Resource/Environmental Concerns none    Transportation Concerns none       Transition Planning    Patient/Family Anticipates Transition to home with family    Patient/Family Anticipated Services at Transition none    Transportation Anticipated family or friend will provide;car, drives self       Discharge Needs Assessment    Equipment Currently Used at Home none    Concerns to be Addressed denies needs/concerns at this time    Anticipated Changes Related to Illness none    Equipment Needed After Discharge glucometer               Discharge Plan     Row Name 08/04/22 1170       Plan    Plan Home with family    Patient/Family in Agreement with Plan yes    Plan Comments Pt confirms PCP and pharmacy.Pt denies problems obtaining medications, unless it requires PA, but she works with her PCP regarding those issues.Denies current problem obtaining medications. States she is working with endocrinologist attempting to get continuous glucose monitor,but is waiting on PA for it as well.She reports she isn't currently checking her glucose at home because she doesn't have a glucometer.Discussed with her nurse Sarina, who has place a diabetes ed consult. Pt otherwise denies any dc needs at this time. Await cardiology consult              Continued Care and Services - Admitted Since  8/3/2022    Coordination has not been started for this encounter.       Expected Discharge Date and Time     Expected Discharge Date Expected Discharge Time    Aug 5, 2022          Demographic Summary     Row Name 08/04/22 1337       General Information    Admission Type observation    Arrived From home    Referral Source admission list    Reason for Consult discharge planning    Preferred Language English       Contact Information    Permission Granted to Share Info With                Functional Status     Row Name 08/04/22 1338       Functional Status    Usual Activity Tolerance good    Current Activity Tolerance good       Functional Status, IADL    Medications independent    Meal Preparation independent    Housekeeping independent    Laundry independent              Nataly Arceo RN, [facility]  Office: [phone]  Fax: [phone]  [email or web address]      I met with patient in room wearing PPE: mask and goggles.     Maintained distance greater than six feet and spent </=15 minutes in the room          Nataly Arceo RN

## 2022-08-04 NOTE — PLAN OF CARE
Goal Outcome Evaluation:           Progress: improving  Outcome Evaluation: Patient discharging home with beta blocker, asa and follow up with Dr. Medrano in 2 weeks.

## 2022-08-04 NOTE — CONSULTS
"Diabetes Education  Assessment/Teaching    Patient Name:  Lacie Rivera  YOB: 1985  MRN: [id number]  Admit Date:  8/3/2022      Assessment Date:  8/4/2022  Flowsheet Row Most Recent Value   General Information     Referral From: MD order  [Consult received to teach bs monitoring. Pt does not have A1c result in  EMR. A1c has not been ordered this adm. Adm bs 133.]   Height 160 cm (63\")   Height Method Stated   Weight 99.7 kg (219 lb 12.8 oz)   Weight Method Standing scale   Pregnancy Assessment    Diabetes History    What type of diabetes do you have? Type 2   Length of Diabetes Diagnosis --  [Dx 5 years ago]   Do you test your blood sugar at home? no  [Has used bs meters in the past. In the more recent past, she used the Freestyle Iris but was having trouble with insurance not covering for the sensors.]   Have you had low blood sugar? (<70mg/dl) yes   How often do you have low blood sugar? rare   Education Preferences    Nutrition Information    Assessment Topics    DM Goals           Flowsheet Row Most Recent Value   DM Education Needs    Meter Needs meter  [Pt has used Freestyle meter in the past and requesting educator begin rxs for Freestyle meter, test strips and lancets.]   Meter Type Freestyle   Frequency of Testing Daily  [Discussed importance of checking bs daily and rotating the times when she checks (ac and hs). Gave log book to record bs. Discussed importance of taking to PCP/MD visits.]   Blood Glucose Target Range Reviewed healthy bs range and healthy A1c target. Discussed importance of bs control.   Medication Oral, Other injectables  [Pt taking Jardiance 25 mg daily and Ozempic 0.25 mg week. Pt states she has been taking as prescribed.]   Problem Solving Hypoglycemia, Signs, Symptoms, Treatment  [Gave low bs handout]   Reducing Risks A1C testing  [Gave A1c info sheet]   Healthy Eating --  [Pt may eat 1 meal/day. She usually eats ramen noodles and acosta sausage. Pt drinks 20 " oz gingerale about 2-3 times/week. She usually drinks water. Asked pt if she snacks during the day and she said usually not.]   Motivation Engaged   Teaching Method Explanation, Discussion, Handouts   Patient Response Verbalized understanding            Other Comments:  Met with pt at bedside. Pt lives with partner and her two children. She sees Dr. Adame, endocrinologist. She last saw MD at the beginning of 2022. Discussed importance of follow up. Pt states she does have PCP and usually sees 1-2 times/year.     Pt working from 11:30 am to 9:30 pm. She does not eat any food until 5:30-6:00 pm during break at work. Asked pt if she has been losing weight. She states 2 weeks ago she was 244 and then dropped to 213. Pt's adm weight 219. Pt states she vomits frequently. Pt states has been told by gastroenterologist she digests her food slowly. Discussed this may be related to diabetes complication, gastroparesis. Discussed importance of controlling bs in reducing risk for complications.     Pt verbalized understanding of info and has no additional questions. Rxs started for Freestyle Lite bs meter, Freestyle lite test strips and lancets. Gave pt ADA diabetes booklet and Planning Healthy Meals packet.       Electronically signed by:  Kallie Lackey RN  08/04/22 14:06 EDT

## 2022-08-04 NOTE — DISCHARGE SUMMARY
[facility]    Eamon Constance APRBEVERLY    CHIEF COMPLAINT:     Chest pain    HISTORY OF PRESENT ILLNESS:    Obtained from admitting physician HPI on 8/4/2022:  37-year-old female with 1 week of episodic chest pain, substernal tightness, moderate to severe with exertion, patient denies any associated dyspnea, no cough or fever.  Patient denies any calf pain or swelling, no history of DVT or PE, no recent trips or travel.  Patient also has had an intermittent moderate frontal headache she has had in the past from stress or dehydration.  Patient has been working outside washing cars.  Headache is mild at present, not worst of life, no neck stiffness, no trauma.  Patient has had some intermittent nausea and vomiting as well.  Patient has been taking BC powder for headache.  Patient has had 2 doses today.  Patient does smoke, denies any family history of premature coronary disease she is aware of.  Patient is a diabetic with a history of hypertension and a known left bundle branch block.  Patient tells me she had a stress test a [facility] 1 year ago which she thinks was negative.    8/4/2022 (postobservation admission):  Patient Tonio HPI noted above including approximately 1 week history of intermittent episodes of left-sided chest pain described as a tightness with occasional throbbing.  Pain is reported as typically lasting 5 to 10 minutes when present with no obvious provoking or palliative factors noted.  Some associated palpitations are present at times but she denies any dyspnea, nausea or vomiting, peripheral edema, syncope or near syncope.  Patient does continue to smoke less than 1 pack of cigarettes per day and does not drink alcohol.  No significant family history is reported.  Patient does have a history of NICOLAS not currently using any NIPPV therapy        Past Medical History:   Diagnosis Date   • Diabetes mellitus (HCC)    • GERD (gastroesophageal reflux disease)    •  Hypertension    • Sleep apnea      History reviewed. No pertinent surgical history.  History reviewed. No pertinent family history.  Social History     Tobacco Use   • Smoking status: Current Every Day Smoker     Packs/day: 1.00     Types: Cigarettes   Substance Use Topics   • Alcohol use: Not Currently   • Drug use: Never     No medications prior to admission.     Allergies:  Compazine [prochlorperazine edisylate], Reglan [metoclopramide], and Victoza [liraglutide]    Immunization History   Administered Date(s) Administered   • Tdap 04/05/2021, 06/18/2021           REVIEW OF SYSTEMS:    Review of Systems   Constitutional: Negative.   HENT: Negative.    Eyes: Negative.    Cardiovascular: Positive for chest pain and palpitations.   Respiratory: Negative.    Skin: Negative.    Musculoskeletal: Negative.    Gastrointestinal: Negative.    Genitourinary: Negative.    Neurological: Negative.    Psychiatric/Behavioral: Negative.        Vital Signs  Temp:  [97.7 °F (36.5 °C)-98.1 °F (36.7 °C)] 98.1 °F (36.7 °C)  Heart Rate:  [] 86  Resp:  [17-18] 18  BP: (111-140)/(73-94) 121/83          Physical Exam:  Physical Exam  Constitutional:       General: She is not in acute distress.     Appearance: Normal appearance. She is obese. She is not ill-appearing, toxic-appearing or diaphoretic.   HENT:      Head: Normocephalic.      Right Ear: External ear normal.      Left Ear: External ear normal.      Nose: Nose normal.      Mouth/Throat:      Mouth: Mucous membranes are moist.   Eyes:      Extraocular Movements: Extraocular movements intact.   Cardiovascular:      Rate and Rhythm: Normal rate and regular rhythm.      Pulses: Normal pulses.   Pulmonary:      Effort: Pulmonary effort is normal.      Breath sounds: Normal breath sounds.   Abdominal:      General: Bowel sounds are normal.      Palpations: Abdomen is soft.   Musculoskeletal:         General: Normal range of motion.      Cervical back: Normal range of motion.       Right lower leg: No edema.      Left lower leg: No edema.   Skin:     General: Skin is warm and dry.      Capillary Refill: Capillary refill takes less than 2 seconds.   Neurological:      General: No focal deficit present.      Mental Status: She is alert.   Psychiatric:         Mood and Affect: Mood normal.         Behavior: Behavior normal.         Thought Content: Thought content normal.         Judgment: Judgment normal.         Emotional Behavior:   Normal   Debilities:  None  Results Review:    I reviewed the patient's new clinical results.  Lab Results (most recent)     Procedure Component Value Units Date/Time    POC Glucose Once [[id number]]  (Abnormal) Collected: 08/04/22 0711    Specimen: Blood Updated: 08/04/22 0712     Glucose 144 mg/dL      Comment: Serial Number: [id number]:  [id number]       Troponin [[id number]]  (Normal) Collected: 08/04/22 0353    Specimen: Blood Updated: 08/04/22 0518     Troponin T <0.010 ng/mL     Narrative:      Troponin T Reference Range:  <= 0.03 ng/mL-   Negative for AMI  >0.03 ng/mL-     Abnormal for myocardial necrosis.  Clinicians would have to utilize clinical acumen, EKG, Troponin and serial changes to determine if it is an Acute Myocardial Infarction or myocardial injury due to an underlying chronic condition.       Results may be falsely decreased if patient taking Biotin.      [place] Draw [[id number]] Collected: 08/03/22 2340    Specimen: Blood Updated: 08/04/22 0048    Narrative:      The following orders were created for panel order [place] Draw.  Procedure                               Abnormality         Status                     ---------                               -----------         ------                     Green Top (Gel)[[id number]]                                  Final result               Lavender Top[[id number]]                                     Final result               Gold Top - SST[[id number]]                                   Final  result               Light Blue Top[[id number]]                                   Final result                 Please view results for these tests on the individual orders.    Lavender Top [[id number]] Collected: 08/03/22 2340    Specimen: Blood Updated: 08/04/22 0048     Extra Tube hold for add-on     Comment: Auto resulted       Light Blue Top [[id number]] Collected: 08/03/22 2340    Specimen: Blood Updated: 08/04/22 0048     Extra Tube Hold for add-ons.     Comment: Auto resulted       COVID-19,CEPHEID/ALIN,COR/JANICE/PAD/JAIR IN-HOUSE(OR EMERGENT/ADD-ON),NP SWAB IN TRANSPORT MEDIA 3-4 HR TAT, RT-PCR - Swab, Nasopharynx [[id number]]  (Normal) Collected: 08/04/22 0020    Specimen: Swab from Nasopharynx Updated: 08/04/22 0044     COVID19 Not Detected    Narrative:      Fact sheet for providers: https://www.fda.gov/media/495907/download     Fact sheet for patients: https://www.fda.gov/media/251939/download  Fact sheet for providers: https://www.fda.gov/media/249714/download    Fact sheet for patients: https://www.fda.gov/media/433348/download    Test performed by PCR.    hCG, Serum, Qualitative [[id number]]  (Normal) Collected: 08/03/22 2340    Specimen: Blood Updated: 08/04/22 0032     HCG Qualitative Negative    Protime-INR [[id number]]  (Normal) Collected: 08/03/22 2340    Specimen: Blood Updated: 08/04/22 0028     Protime 10.2 Seconds      INR 0.99    aPTT [[id number]]  (Abnormal) Collected: 08/03/22 2340    Specimen: Blood Updated: 08/04/22 0028     PTT 28.5 seconds     Gold Top - SST [[id number]] Collected: 08/03/22 2340    Specimen: Blood Updated: 08/04/22 0024     Extra Tube      Green Top (Gel) [[id number]] Collected: 08/03/22 2340    Specimen: Blood Updated: 08/04/22 0024     Extra Tube      Comprehensive Metabolic Panel [[id number]]  (Abnormal) Collected: 08/03/22 2340    Specimen: Blood Updated: 08/04/22 0018     Glucose 133 mg/dL      BUN 8 mg/dL      Creatinine 0.87 mg/dL      Sodium 142 mmol/L      Potassium 3.3  mmol/L      Chloride 103 mmol/L      CO2 28.0 mmol/L      Calcium 9.2 mg/dL      Total Protein 6.9 g/dL      Albumin 4.20 g/dL      ALT (SGPT) 10 U/L      AST (SGOT) 11 U/L      Alkaline Phosphatase 64 U/L      Total Bilirubin 0.3 mg/dL      Globulin 2.7 gm/dL      A/G Ratio 1.6 g/dL      BUN/Creatinine Ratio 9.2     Anion Gap 11.0 mmol/L      eGFR 88.1 mL/min/1.73      Comment: National Kidney Foundation and American Society of Nephrology (ASN) Task Force recommended calculation based on the Chronic Kidney Disease Epidemiology Collaboration (CKD-EPI) equation refit without adjustment for race.       Narrative:      GFR Normal >60  Chronic Kidney Disease <60  Kidney Failure <15      Salicylate Level [[id number]]  (Normal) Collected: 08/03/22 2340    Specimen: Blood Updated: 08/04/22 0018     Salicylate <0.3 mg/dL     Troponin [[id number]]  (Normal) Collected: 08/03/22 2340    Specimen: Blood Updated: 08/04/22 0018     Troponin T <0.010 ng/mL     Narrative:      Troponin T Reference Range:  <= 0.03 ng/mL-   Negative for AMI  >0.03 ng/mL-     Abnormal for myocardial necrosis.  Clinicians would have to utilize clinical acumen, EKG, Troponin and serial changes to determine if it is an Acute Myocardial Infarction or myocardial injury due to an underlying chronic condition.       Results may be falsely decreased if patient taking Biotin.      CBC & Differential [[id number]]  (Abnormal) Collected: 08/03/22 2340    Specimen: Blood Updated: 08/04/22 0009    Narrative:      The following orders were created for panel order CBC & Differential.  Procedure                               Abnormality         Status                     ---------                               -----------         ------                     CBC Auto Differential[[id number]]        Abnormal            Final result                 Please view results for these tests on the individual orders.    CBC Auto Differential [[id number]]  (Abnormal) Collected:  08/03/22 2340    Specimen: Blood Updated: 08/04/22 0009     WBC 8.30 10*3/mm3      RBC 4.76 10*6/mm3      Hemoglobin 12.7 g/dL      Hematocrit 38.6 %      MCV 81.2 fL      MCH 26.6 pg      MCHC 32.8 g/dL      RDW 13.9 %      RDW-SD 39.8 fl      MPV 7.8 fL      Platelets 308 10*3/mm3      Neutrophil % 49.1 %      Lymphocyte % 39.8 %      Monocyte % 7.7 %      Eosinophil % 2.8 %      Basophil % 0.6 %      Neutrophils, Absolute 4.10 10*3/mm3      Lymphocytes, Absolute 3.30 10*3/mm3      Monocytes, Absolute 0.60 10*3/mm3      Eosinophils, Absolute 0.20 10*3/mm3      Basophils, Absolute 0.00 10*3/mm3      nRBC 0.2 /100 WBC           Imaging Results (Most Recent)     Procedure Component Value Units Date/Time    XR Chest 1 View [[id number]] Collected: 08/04/22 0815     Updated: 08/04/22 0819    Narrative:         DATE OF EXAM:   8/4/2022 12:01 AM     PROCEDURE:   XR CHEST 1 VW-     INDICATIONS:   cp     COMPARISON:  No Comparisons Available     TECHNIQUE:   Portable Chest     FINDINGS:      Study is limited secondary to overlying support and monitoring apparatus     Lung volumes are low. The heart and mediastinal contours are within  normal limits for technique. There are some vascular crowding at the  lung bases. The lungs are otherwise grossly clear. Osseous structures  are unremarkable in appearance.        Impression:      IMPRESSION :   Negative low lung volume portable chest[     Electronically Signed By-Grzegorz Radford On:8/4/2022 8:16 AM  This report was finalized on [id number] by  Grzegorz Radford, .        reviewed    ECG/EMG Results (most recent)     Procedure Component Value Units Date/Time    ECG 12 Lead [[id number]] Collected: 08/03/22 2247     Updated: 08/03/22 2248     QT Interval 404 ms     Narrative:      HEART RATE= 102  bpm  RR Interval= 592  ms  LA Interval= 147  ms  P Horizontal Axis= 19  deg  P Front Axis= 24  deg  QRSD Interval= 144  ms  QT Interval= 404  ms  QRS Axis= 3  deg  T Wave Axis= 33   deg  - ABNORMAL ECG -  Sinus tachycardia  Left bundle branch block  ST elevation secondary to IVCD  When compared with ECG of 03-Feb-2021 23:13:44,  No significant change  Electronically Signed By:   Date and Time of Study: 2022-08-03 22:47:23    Adult Transthoracic Echo Complete W/ Cont if Necessary Per Protocol [[id number]] Resulted: 08/04/22 1044     Updated: 08/04/22 1048     Target HR (85%) 156 bpm      Max. Pred. HR (100%) 183 bpm      ACS 2.8 cm      Ao root diam 2.07 cm      Ao pk kush 135.9 cm/sec      Ao V2 VTI 26.4 cm      CARIN(I,D) 1.84 cm2      EDV(cubed) 25.4 ml      EDV(MOD-sp4) 82.0 ml      EF(MOD-bp) 67.0 %      EF(MOD-sp4) 66.5 %      ESV(cubed) 9.7 ml      ESV(MOD-sp4) 27.5 ml      IVS/LVPW 1.04 cm      LV mass(C)d 131.3 grams      LV V1 max PG 2.27 mmHg      LV V1 mean PG 1.36 mmHg      LV V1 max 75.4 cm/sec      LVPWd 1.34 cm      MR max PG 26.0 mmHg      MV dec slope 172.8 cm/sec2      MV dec time 0.21 msec      MV V2 VTI 28.2 cm      MVA(VTI) 1.72 cm2      PA V2 max 112.9 cm/sec      RAP systole 3.0 mmHg      RV V1 max PG 1.64 mmHg      RV V1 max 64.1 cm/sec      RV V1 VTI 11.0 cm      RVIDd 3.1 cm      RVSP(TR) 22.3 mmHg      SV(LVOT) 48.5 ml      SV(MOD-sp4) 54.6 ml      TR max PG 19.3 mmHg      Ao max PG 7.4 mmHg      Ao mean PG 4.4 mmHg      FS 27.5 %      IVSd 1.39 cm      LA dimension (2D)  3.5 cm      LV V1 VTI 14.1 cm      LVIDd 2.9 cm      LVIDs 2.13 cm      LVOT area 3.4 cm2      LVOT diam 2.09 cm      MV E/A 0.41     MV max PG 4.0 mmHg      MV mean PG 1.76 mmHg      MR max kush 255.1 cm/sec      MV A max kush 90.1 cm/sec      MV E max kush 36.8 cm/sec      TR max kush 219.8 cm/sec      Echo EF Estimated 65 %     Narrative:      · Left ventricular wall thickness is consistent with mild concentric   hypertrophy.  · Estimated left ventricular EF = 65% Estimated left ventricular EF was in   agreement with the calculated left ventricular EF. Left ventricular   systolic function is normal.  ·  Left ventricular diastolic function is consistent with (grade I)   impaired relaxation.  · Estimated right ventricular systolic pressure from tricuspid   regurgitation is normal (<35 mmHg).       SCANNED - TELEMETRY   [[id number]] Resulted: 08/03/22     Updated: 08/04/22 1149        reviewed        Results for orders placed during the hospital encounter of 08/03/22    Adult Transthoracic Echo Complete W/ Cont if Necessary Per Protocol    Interpretation Summary  · Left ventricular wall thickness is consistent with mild concentric hypertrophy.  · Estimated left ventricular EF = 65% Estimated left ventricular EF was in agreement with the calculated left ventricular EF. Left ventricular systolic function is normal.  · Left ventricular diastolic function is consistent with (grade I) impaired relaxation.  · Estimated right ventricular systolic pressure from tricuspid regurgitation is normal (<35 mmHg).      Microbiology Results (last 10 days)     Procedure Component Value - Date/Time    COVID-19,CEPHEID/ALIN,COR/JANICE/PAD/JAIR IN-HOUSE(OR EMERGENT/ADD-ON),NP SWAB IN TRANSPORT MEDIA 3-4 HR TAT, RT-PCR - Swab, Nasopharynx [[id number]]  (Normal) Collected: 08/04/22 0020    Lab Status: Final result Specimen: Swab from Nasopharynx Updated: 08/04/22 0044     COVID19 Not Detected    Narrative:      Fact sheet for providers: https://www.fda.gov/media/908521/download     Fact sheet for patients: https://www.fda.gov/media/251602/download  Fact sheet for providers: https://www.fda.gov/media/460684/download    Fact sheet for patients: https://www.fda.gov/media/213830/download    Test performed by PCR.          Assessment & Plan     Chest pain       Chest pain  -Serial troponin: Less than 0.010  -Chest X-Ray: Negative with low lung volumes  -EKG shows sinus tachycardia at 102 with left bundle branch block along with PVC (left bundle branch block present persistently since November 2020)  -Stress Test showed no significant reversible  ischemia with a small area of mild intensity mostly fixed perfusion defect involving the anteroseptal anteroapical wall likely related to her known conduction defect with an EF calculated at 56% consistent with a low risk study  -Echocardiogram showed mild concentric hypertrophy with an EF of 65% and grade 1 diastolic dysfunction  -Telemetry  -NPO    Hypokalemia  -Replacement protocol given in ED  -Monitor while admitted    Headache  -Patient reports continued persistent mild headache  -Fioricet ordered    Diabetes mellitus  -Well controlled with a most recent glucose of 144  -Hold Jardiance and semaglutide  -Sliding scale insulin ordered  -Diabetic diet  -Monitor AC and HS    Hypertension  -Well controlled with a most recent blood pressure of 111/73  - Continue lisinopril-hydrochlorothiazide  - Monitor while admitted    Obesity (BMI: 38.94)  -Encourage diet lifestyle modification      I discussed the patients findings and my recommendations with patient and nursing staff.     Discharge Diagnosis:      Chest pain      Hospital Course  Patient is a 37 y.o. female presented with chest pain and headache with an HPI noted above.  Serial troponins remain less than 0.010 and chest x-ray showed low lung volumes was otherwise unremarkable.  EKG was obtained which showed sinus tachycardia at 102 with a left bundle branch block as well as PVC with previous EKGs demonstrating left bundle branch block dating back to 2020.  She is continued on telemetry without significant events noted and stress testing was performed on 8/4/2022 which was consistent with a low risk study with a small area of mild intensity mostly fixed perfusion defect in the anteroseptal and anteroapical wall reported is likely secondary to underlying conduction defect by interpreting physician.  Echocardiogram was also obtained and showed mild concentric hypertrophy with an EF of 65% and grade 1 diastolic dysfunction.  Cardiology was consulted who recommended  medical management with beta-blocker, statin and aspirin therapy initiated at discharge.  At this time patient is felt to be in good condition for discharge with close follow-up with her PCP as well as cardiology on an outpatient basis.  Her full testing/results and plan were discussed with patient along with concerning/alarm symptoms which to call 911/return to the ED.  All questions were answered and she verbalizes her understanding and agreement.    Past Medical History:     Past Medical History:   Diagnosis Date   • Diabetes mellitus (HCC)    • GERD (gastroesophageal reflux disease)    • Hypertension    • Sleep apnea        Past Surgical History:   History reviewed. No pertinent surgical history.    Social History:   Social History     Socioeconomic History   • Marital status: Single   Tobacco Use   • Smoking status: Current Every Day Smoker     Packs/day: 1.00     Types: Cigarettes   Substance and Sexual Activity   • Alcohol use: Not Currently   • Drug use: Never       Procedures Performed         Consults:   Consults     Date and Time Order Name Status Description    8/4/2022 11:20 AM Inpatient Cardiology Consult            Condition on Discharge:     Stable    Discharge Disposition  Home or Self Care    Discharge Medications     Discharge Medications      New Medications      Instructions Start Date   aspirin 81 MG EC tablet   81 mg, Oral, Daily      atorvastatin 20 MG tablet  Commonly known as: LIPITOR   20 mg, Oral, Nightly      freestyle lancets   1 each, Other, Daily, Dx: E11.9      FREESTYLE LITE test strip  Generic drug: glucose blood   1 each, Other, Daily, Dx: E11.90. Use as instructed      FreeStyle Lite w/Device kit   1 kit, Does not apply, Once, Dx: E11.9      metoprolol tartrate 25 MG tablet  Commonly known as: LOPRESSOR   12.5 mg, Oral, Every 12 Hours Scheduled         Continue These Medications      Instructions Start Date   dexlansoprazole 60 MG capsule  Commonly known as: DEXILANT   60 mg,  Oral, Daily      empagliflozin 25 MG tablet tablet  Commonly known as: JARDIANCE   25 mg, Oral, Daily      lisinopril-hydrochlorothiazide 20-12.5 MG per tablet  Commonly known as: PRINZIDE,ZESTORETIC   2 tablets, Oral, Daily      meclizine 25 MG tablet  Commonly known as: ANTIVERT   25 mg, Oral, 2 Times Daily      Motegrity 2 MG tablet  Generic drug: Prucalopride Succinate   2 mg, Oral, Daily      OZEMPIC (2 MG/DOSE) SC   0.25 mg, Subcutaneous, Weekly             Discharge Diet:     Activity at Discharge:   Activity Instructions     Work Restrictions      Type of Restriction: Work    May Return to Work: Specific Date    Return To Work Date: 8/8/2022    With / Without Restrictions: Without Restrictions          Follow-up Appointments  No future appointments.  Additional Instructions for the Follow-ups that You Need to Schedule     Discharge Follow-up with PCP   As directed       Currently Documented PCP:    Constance Knutson APRN    PCP Phone Number:    [phone]     Follow Up Details: 5 to 7 days         Discharge Follow-up with Specified Provider: Cardiology; 2 Weeks   As directed      To: Cardiology    Follow Up: 2 Weeks               Test Results Pending at Discharge       Risk for Readmission (LACE) Score: 2 (8/4/2022  6:01 AM)          Rj Sales PA-C  08/04/22  16:31 EDT

## 2022-08-04 NOTE — ED PROVIDER NOTES
Subjective   37-year-old female with 1 week of episodic chest pain, substernal tightness, moderate to severe with exertion, patient denies any associated dyspnea, no cough or fever.  Patient denies any calf pain or swelling, no history of DVT or PE, no recent trips or travel.  Patient also has had an intermittent moderate frontal headache she has had in the past from stress or dehydration.  Patient has been working outside washing cars.  Headache is mild at present, not worst of life, no neck stiffness, no trauma.  Patient has had some intermittent nausea and vomiting as well.  Patient has been taking BC powder for headache.  Patient has had 2 doses today.  Patient does smoke, denies any family history of premature coronary disease she is aware of.  Patient is a diabetic with a history of hypertension and a known left bundle branch block.  Patient tells me she had a stress test a [facility] 1 year ago which she thinks was negative.          Review of Systems   Constitutional: Negative.    Respiratory: Negative.    Cardiovascular: Positive for chest pain.   Gastrointestinal: Positive for nausea and vomiting.   Genitourinary: Negative.    Musculoskeletal: Negative.    Skin: Negative.    Neurological: Positive for light-headedness and headaches.   Psychiatric/Behavioral: Negative.    All other systems reviewed and are negative.      No past medical history on file.    Allergies   Allergen Reactions   • Compazine [Prochlorperazine Edisylate] Mental Status Change   • Reglan [Metoclopramide] Palpitations   • Victoza [Liraglutide] GI Intolerance       No past surgical history on file.    No family history on file.    Social History     Socioeconomic History   • Marital status: Single           Objective   Physical Exam  Constitutional:       Appearance: Normal appearance. She is well-developed.   HENT:      Head: Normocephalic and atraumatic.      Mouth/Throat:      Mouth: Mucous membranes are moist.       Pharynx: Oropharynx is clear.   Eyes:      Extraocular Movements: Extraocular movements intact.      Conjunctiva/sclera: Conjunctivae normal.      Pupils: Pupils are equal, round, and reactive to light.   Cardiovascular:      Rate and Rhythm: Normal rate and regular rhythm.      Heart sounds: Normal heart sounds.   Pulmonary:      Effort: Pulmonary effort is normal.      Breath sounds: Normal breath sounds.   Abdominal:      General: Bowel sounds are normal. There is no distension.      Palpations: Abdomen is soft.      Tenderness: There is no abdominal tenderness.   Musculoskeletal:         General: Normal range of motion.      Cervical back: Normal range of motion and neck supple. No rigidity.   Skin:     General: Skin is warm and dry.      Capillary Refill: Capillary refill takes less than 2 seconds.   Neurological:      Mental Status: She is alert and oriented to person, place, and time.      Cranial Nerves: No cranial nerve deficit.      Sensory: No sensory deficit.      Motor: No weakness.   Psychiatric:         Mood and Affect: Mood normal.         Behavior: Behavior normal.         Procedures           ED Course  ED Course as of 08/04/22 0055   Wed Aug 03, 2022   2318 EKG interpretation: Sinus tachycardia with occasional PVC, left bundle branch block, rate 102 [JR]      ED Course User Index  [JR] Abner Hernandez MD                                           MDM  Number of Diagnoses or Management Options  Acute nonintractable headache, unspecified headache type  Chest pain, unspecified type  Diagnosis management comments: Results for orders placed or performed during the hospital encounter of 08/03/22  -COVID-19,CEPHEID/ALIN,COR/JANICE/PAD/JAIR IN-HOUSE(OR EMERGENT/ADD-ON),NP SWAB IN TRANSPORT MEDIA 3-4 HR TAT, RT-PCR - Swab, Nasopharynx:   Specimen: Nasopharynx; Swab       Result                      Value             Ref Range           COVID19                     Not Detected      Not Detected*  -Comprehensive  Metabolic Panel:   Specimen: Blood       Result                      Value             Ref Range           Glucose                     133 (H)           65 - 99 mg/dL       BUN                         8                 6 - 20 mg/dL        Creatinine                  0.87              0.57 - 1.00 *       Sodium                      142               136 - 145 mm*       Potassium                   3.3 (L)           3.5 - 5.2 mm*       Chloride                    103               98 - 107 mmo*       CO2                         28.0              22.0 - 29.0 *       Calcium                     9.2               8.6 - 10.5 m*       Total Protein               6.9               6.0 - 8.5 g/*       Albumin                     4.20              3.50 - 5.20 *       ALT (SGPT)                  10                1 - 33 U/L          AST (SGOT)                  11                1 - 32 U/L          Alkaline Phosphatase        64                39 - 117 U/L        Total Bilirubin             0.3               0.0 - 1.2 mg*       Globulin                    2.7               gm/dL               A/G Ratio                   1.6               g/dL                BUN/Creatinine Ratio        9.2               7.0 - 25.0          Anion Gap                   11.0              5.0 - 15.0 m*       eGFR                        88.1              >60.0 mL/min*  -Protime-INR:   Specimen: Blood       Result                      Value             Ref Range           Protime                     10.2              9.6 - 11.7 S*       INR                         0.99              0.93 - 1.10    -aPTT:   Specimen: Blood       Result                      Value             Ref Range           PTT                         28.5 (L)          61.0 - 76.5 *  -Salicylate Level:   Specimen: Blood       Result                      Value             Ref Range           Salicylate                  <0.3              <=30.0 mg/dL   -hCG, Serum, Qualitative:    Specimen: Blood       Result                      Value             Ref Range           HCG Qualitative             Negative          Negative       -Troponin:   Specimen: Blood       Result                      Value             Ref Range           Troponin T                  <0.010            0.000 - 0.03*  -CBC Auto Differential:   Specimen: Blood       Result                      Value             Ref Range           WBC                         8.30              3.40 - 10.80*       RBC                         4.76              3.77 - 5.28 *       Hemoglobin                  12.7              12.0 - 15.9 *       Hematocrit                  38.6              34.0 - 46.6 %       MCV                         81.2              79.0 - 97.0 *       MCH                         26.6              26.6 - 33.0 *       MCHC                        32.8              31.5 - 35.7 *       RDW                         13.9              12.3 - 15.4 %       RDW-SD                      39.8              37.0 - 54.0 *       MPV                         7.8               6.0 - 12.0 fL       Platelets                   308               140 - 450 10*       Neutrophil %                49.1              42.7 - 76.0 %       Lymphocyte %                39.8              19.6 - 45.3 %       Monocyte %                  7.7               5.0 - 12.0 %        Eosinophil %                2.8               0.3 - 6.2 %         Basophil %                  0.6               0.0 - 1.5 %         Neutrophils, Absolute       4.10              1.70 - 7.00 *       Lymphocytes, Absolute       3.30 (H)          0.70 - 3.10 *       Monocytes, Absolute         0.60              0.10 - 0.90 *       Eosinophils, Absolute       0.20              0.00 - 0.40 *       Basophils, Absolute         0.00              0.00 - 0.20 *       nRBC                        0.2               0.0 - 0.2 /1*  -ECG 12 Lead:        Result                      Value             Ref  Range           QT Interval                 404               ms             -Green Top (Gel):        Result                      Value             Ref Range           Extra Tube                                                   -Lavender Top:        Result                      Value             Ref Range           Extra Tube                                                    hold for add-on  -Gold Top - SST:        Result                      Value             Ref Range           Extra Tube                                                   -Light Blue Top:        Result                      Value             Ref Range           Extra Tube                                                    Hold for add-ons.    Patient well, vital signs stable, no chest pain currently.  Patient reports exertional chest pain with risk factors including tobacco abuse, hypertension, diabetes.  Will place in ED observation, heart score 4.       Amount and/or Complexity of Data Reviewed  Clinical lab tests: ordered and reviewed  Tests in the radiology section of CPT®: ordered and reviewed  Tests in the medicine section of CPT®: reviewed and ordered  Independent visualization of images, tracings, or specimens: yes        Final diagnoses:   Chest pain, unspecified type   Acute nonintractable headache, unspecified headache type       ED Disposition  ED Disposition     ED Disposition   Decision to Admit    Condition   --    Comment   --             No follow-up provider specified.       Medication List      No changes were made to your prescriptions during this visit.          Abner Hernandez MD  08/04/22 0055

## 2022-08-04 NOTE — PLAN OF CARE
Goal Outcome Evaluation:  Plan of Care Reviewed With: patient        Progress: no change  Outcome Evaluation: New admission from ED with chest pain. No current complaints. VSS. Patient to have possible myoview today. Will monitor.

## 2022-08-05 NOTE — CASE MANAGEMENT/SOCIAL WORK
Case Management Discharge Note      Final Note: Routine home         Selected Continued Care - Discharged on 8/4/2022 Admission date: 8/3/2022 - Discharge disposition: Home or Self Care            Transportation Services  Private: Car    Final Discharge Disposition Code: 01 - home or self-care

## 2022-08-07 LAB — QT INTERVAL: 404 MS

## 2022-08-08 ENCOUNTER — TELEPHONE (OUTPATIENT)
Dept: DIABETES SERVICES | Facility: HOSPITAL | Age: 37
End: 2022-08-08

## 2022-08-11 ENCOUNTER — TELEPHONE (OUTPATIENT)
Dept: DIABETES SERVICES | Facility: HOSPITAL | Age: 37
End: 2022-08-11

## 2022-08-11 NOTE — TELEPHONE ENCOUNTER
Pt states rxs were sent to pharmacy for the Freestyle Lite bs meter, test strips and lancets but she is unable to afford the copay for these supplies. Discussed pt needs to check with insurance to see which meter/supplies they cover best for and then have PCP send rxs to pharmacy for this meter and supplies. Discussed importance of bs monitoring at home. Pt verbalized understanding and states she will check into this.

## 2022-09-06 ENCOUNTER — OFFICE VISIT (OUTPATIENT)
Dept: CARDIOLOGY | Facility: CLINIC | Age: 37
End: 2022-09-06

## 2022-09-06 VITALS
WEIGHT: 236 LBS | HEART RATE: 83 BPM | HEIGHT: 63 IN | BODY MASS INDEX: 41.82 KG/M2 | SYSTOLIC BLOOD PRESSURE: 131 MMHG | DIASTOLIC BLOOD PRESSURE: 80 MMHG | OXYGEN SATURATION: 100 %

## 2022-09-06 DIAGNOSIS — E78.2 MIXED HYPERLIPIDEMIA: ICD-10-CM

## 2022-09-06 DIAGNOSIS — R42 DIZZINESS: ICD-10-CM

## 2022-09-06 DIAGNOSIS — R00.2 PALPITATIONS: Primary | ICD-10-CM

## 2022-09-06 DIAGNOSIS — I10 PRIMARY HYPERTENSION: ICD-10-CM

## 2022-09-06 PROBLEM — E78.5 HYPERLIPIDEMIA: Status: ACTIVE | Noted: 2022-09-06

## 2022-09-06 PROCEDURE — 99213 OFFICE O/P EST LOW 20 MIN: CPT | Performed by: NURSE PRACTITIONER

## 2022-09-06 NOTE — PROGRESS NOTES
Cardiology Office Consultation      Encounter Date:  09/06/2022    Patient ID:   Lacie Rivera is a 37 y.o. female.    Reason For Consultation:  Chest pain  Left bundle branch block    History of Present Illness:  Dear  Constance Knutson APRN  It was my pleasure to see Ms. Rivera in new cardiology consultation.  She is a 37-year-old female with no known history of premature coronary disease.  She does have past medical history of diabetes mellitus 2, primary hypertension, sleep apnea, GERD.  The patient presented to [facility] emergency department 8/3/2022 for symptoms of chest discomfort, moderate to severe with exertion with no associated symptoms.  She underwent complete ischemic work-up including 2D echocardiogram that showed normal LV systolic function with EF 65%, grade 1 DD.  Nuclear stress testing was performed with small area of mild intensity mostly fixed perfusion defect involving the anterior septal and anterior apical wall likely secondary to underlying conduction defect/left bundle branch block.  No significant reversible ischemia-low risk study.  Cardiology was consulted and patient was started on beta-blocker, statin and aspirin therapy at discharge.  She presents today in follow-up from that hospitalization.    Today, the patient denies any symptoms of chest discomfort but describes primarily palpitations with sensation of her heart skipping beats.  She reports associated chest tightness.  These episodes last approximately 2 minutes with no exertional component.  She reports the palpitations have recently increased in frequency.  She denies any dizziness, lightheadedness but states she does carry a diagnosis of vertigo and does have some occasional dizziness that she equates to this diagnosis.  She denies any lower extremity edema, near syncopal or syncopal episodes.      Assessment & Plan    Impressions:  Chest pain  Palpitations  Dizziness  Left bundle branch block  Mixed  "hyperlipidemia  Primary hypertension  Gastroesophageal reflux disease diabetes mellitus 2    Recommendations:  I will fit patient with a 2-week mobile cardiac telemetry monitor to evaluate her palpitations  Continue risk factor modifications including diet, exercise  Continue current CV plan of care  Follow-up in 1 month    Objective:    Vitals:  Vitals:    09/06/22 1409   BP: 131/80   Pulse: 83   SpO2: 100%   Weight: 107 kg (236 lb)   Height: 160 cm (63\")       Review of Systems   Cardiovascular: Positive for palpitations.   Gastrointestinal: Positive for heartburn.   All other systems reviewed and are negative.      Review of Systems:  The following systems were reviewed as they relate to the cardiovascular system: Constitutional, Eyes, ENT, Cardiovascular, Respiratory, Gastrointestinal, Integumentary, Neurological, Psychiatric, Hematologic, Endocrine, Musculoskeletal, and Genitourinary. The pertinent cardiovascular findings are reported above with all other cardiovascular points within those systems being negative.    Physical Exam:     General: Alert, cooperative, no distress, appears stated age  Head:  Normocephalic, atraumatic, mucous membranes moist  Eyes:  Conjunctiva/corneas clear, EOM's intact     Neck:  Supple,  no adenopathy;      Lungs: Clear to auscultation bilaterally, no wheezes rhonchi rales are noted  Chest wall: No tenderness  Heart::  Regular rate and rhythm, S1 and S2 normal, no murmur, rub or gallop  Musculoskeletal:   Ambulates freely without assistance  Abdomen: Soft, non-tender, nondistended bowel sounds active  Extremities: No cyanosis, clubbing, or edema  Pulses: 2+ and symmetric all extremities  Skin:  No rashes or lesions  Neuro/psych: A&O x3. CN II through XII are grossly intact with appropriate affect    History of Present Illness    Procedures    Allergies:  Allergies   Allergen Reactions   • Psyllium Anaphylaxis     Natural oranges   • Compazine [Prochlorperazine Edisylate] Mental " Status Change   • Prochlorperazine Other (See Comments)   • Reglan [Metoclopramide] Palpitations   • Victoza [Liraglutide] GI Intolerance       Medication Review:     Current Outpatient Medications:   •  atorvastatin (LIPITOR) 20 MG tablet, Take 1 tablet by mouth Every Night., Disp: 90 tablet, Rfl: 0  •  dexlansoprazole (DEXILANT) 60 MG capsule, Take 60 mg by mouth Daily., Disp: , Rfl:   •  empagliflozin (JARDIANCE) 25 MG tablet tablet, Take 25 mg by mouth Daily., Disp: , Rfl:   •  FREESTYLE LITE test strip, 1 each by Other route Daily. Dx: E11.90. Use as instructed, Disp: 50 each, Rfl: 2  •  Lancets (freestyle) lancets, 1 each by Other route Daily. Dx: E11.9, Disp: 100 each, Rfl: 2  •  lisinopril-hydrochlorothiazide (PRINZIDE,ZESTORETIC) 20-12.5 MG per tablet, Take 2 tablets by mouth Daily., Disp: , Rfl:   •  meclizine (ANTIVERT) 25 MG tablet, Take 25 mg by mouth 2 (Two) Times a Day., Disp: , Rfl:   •  Prucalopride Succinate (Motegrity) 2 MG tablet, Take 2 mg by mouth Daily., Disp: , Rfl:   •  Semaglutide (OZEMPIC, 2 MG/DOSE, SC), Inject 0.25 mg under the skin into the appropriate area as directed 1 (One) Time Per Week., Disp: , Rfl:   •  metoprolol tartrate (LOPRESSOR) 25 MG tablet, Take 0.5 tablets by mouth Every 12 (Twelve) Hours for 30 days., Disp: 30 tablet, Rfl: 0    Family History:  Family History   Problem Relation Age of Onset   • No Known Problems Mother    • No Known Problems Father        Past Medical History:  Past Medical History:   Diagnosis Date   • Diabetes mellitus (HCC)    • GERD (gastroesophageal reflux disease)    • Hypertension    • Sleep apnea        Past surgical History:  History reviewed. No pertinent surgical history.    Social History:  Social History     Socioeconomic History   • Marital status: Single   Tobacco Use   • Smoking status: Current Every Day Smoker     Packs/day: 1.00     Types: Cigarettes   Substance and Sexual Activity   • Alcohol use: Not Currently   • Drug use: Never  "          NOTE: The following portions of the patient's history were reviewed and updated this visit as appropriate: allergies, current medications, past family history, past medical history, past social history, past surgical history and problem list.    EMR Dragon/Transcription:   \"Dictated utilizing Dragon dictation\".     "

## 2022-09-12 ENCOUNTER — APPOINTMENT (OUTPATIENT)
Dept: CARDIOLOGY | Facility: HOSPITAL | Age: 37
End: 2022-09-12

## 2022-09-15 ENCOUNTER — APPOINTMENT (OUTPATIENT)
Dept: CARDIOLOGY | Facility: HOSPITAL | Age: 37
End: 2022-09-15

## 2022-09-20 ENCOUNTER — HOSPITAL ENCOUNTER (OUTPATIENT)
Dept: CARDIOLOGY | Facility: HOSPITAL | Age: 37
Discharge: HOME | End: 2022-09-20
Admitting: NURSE PRACTITIONER

## 2022-09-20 DIAGNOSIS — R42 DIZZINESS: ICD-10-CM

## 2022-09-20 DIAGNOSIS — R00.2 PALPITATIONS: ICD-10-CM

## 2022-09-20 PROCEDURE — 93270 REMOTE 30 DAY ECG REV/REPORT: CPT

## 2022-10-06 LAB
MAXIMAL PREDICTED HEART RATE: 183 BPM
STRESS TARGET HR: 156 BPM

## 2022-10-06 PROCEDURE — 93272 ECG/REVIEW INTERPRET ONLY: CPT | Performed by: INTERNAL MEDICINE

## 2022-10-07 ENCOUNTER — OFFICE (OUTPATIENT)
Dept: URBAN - METROPOLITAN AREA CLINIC 64 | Facility: CLINIC | Age: 37
End: 2022-10-07
Payer: COMMERCIAL

## 2022-10-07 VITALS
HEART RATE: 87 BPM | HEIGHT: 63 IN | WEIGHT: 236 LBS | SYSTOLIC BLOOD PRESSURE: 145 MMHG | DIASTOLIC BLOOD PRESSURE: 95 MMHG

## 2022-10-07 DIAGNOSIS — R11.2 NAUSEA WITH VOMITING, UNSPECIFIED: ICD-10-CM

## 2022-10-07 DIAGNOSIS — K21.9 GASTRO-ESOPHAGEAL REFLUX DISEASE WITHOUT ESOPHAGITIS: ICD-10-CM

## 2022-10-07 DIAGNOSIS — E11.43 TYPE 2 DIABETES MELLITUS WITH DIABETIC AUTONOMIC (POLY)NEURO: ICD-10-CM

## 2022-10-07 PROCEDURE — 99214 OFFICE O/P EST MOD 30 MIN: CPT | Performed by: NURSE PRACTITIONER

## 2022-10-07 RX ORDER — ERYTHROMYCIN ETHYLSUCCINATE 200 MG/5ML
4000 GRANULE, FOR SUSPENSION ORAL
Qty: 600 | Refills: 1 | Status: COMPLETED
Start: 2022-10-07 | End: 2024-01-11

## 2022-10-11 ENCOUNTER — OFFICE VISIT (OUTPATIENT)
Dept: CARDIOLOGY | Facility: CLINIC | Age: 37
End: 2022-10-11

## 2022-10-11 VITALS
HEIGHT: 63 IN | WEIGHT: 239 LBS | BODY MASS INDEX: 42.35 KG/M2 | HEART RATE: 109 BPM | SYSTOLIC BLOOD PRESSURE: 139 MMHG | DIASTOLIC BLOOD PRESSURE: 79 MMHG | OXYGEN SATURATION: 98 %

## 2022-10-11 DIAGNOSIS — I10 PRIMARY HYPERTENSION: ICD-10-CM

## 2022-10-11 DIAGNOSIS — R00.2 PALPITATIONS: ICD-10-CM

## 2022-10-11 DIAGNOSIS — R07.89 OTHER CHEST PAIN: Primary | ICD-10-CM

## 2022-10-11 DIAGNOSIS — E78.2 MIXED HYPERLIPIDEMIA: ICD-10-CM

## 2022-10-11 PROCEDURE — 99213 OFFICE O/P EST LOW 20 MIN: CPT | Performed by: NURSE PRACTITIONER

## 2022-10-11 RX ORDER — ERYTHROMYCIN 500 MG/1
500 TABLET, DELAYED RELEASE ORAL 4 TIMES DAILY
COMMUNITY
Start: 2022-10-12 | End: 2022-12-15

## 2022-10-11 NOTE — PROGRESS NOTES
[facility] CARDIOLOGY      REASON FOR FOLLOW-UP:  Follow-up ambulatory monitor          Chief Complaint   Patient presents with   • Heart Problem     F/u          Dear Constance Knutson APRN        History of Present Illness   It was my pleasure to see Ms. Rivera in the office today.  She is a 37-year-old female with no known history of premature coronary disease.  She does have past medical history of diabetes mellitus 2, primary hypertension, sleep apnea, GERD.  The patient presented to [facility] emergency department 8/3/2022 for symptoms of chest discomfort, moderate to severe with exertion with no associated symptoms.  She underwent complete ischemic work-up including 2D echocardiogram that showed normal LV systolic function with EF 65%, grade 1 DD.  Nuclear stress testing was performed with small area of mild intensity mostly fixed perfusion defect involving the anterior septal and anterior apical wall likely secondary to underlying conduction defect/left bundle branch block.  No significant reversible ischemia-low risk study.  Cardiology was consulted and patient was started on beta-blocker, statin and aspirin therapy at discharge.    At follow-up office visit, the patient reported sensation of her heart skipping beats, palpitations that have recently increased in frequency.  She denied any dizziness, lightheadedness, near syncopal or syncopal episodes.  She was fitted for with mobile cardiac telemetry that showed baseline normal sinus rhythm.  Heart rate range 50-1 35 with rare PACs, occasional-frequent PVCs.  She presents today to review monitor.  The results of her ambulatory monitor were reviewed in detail.  The patient was reassured that her symptoms are caused by benign and nonlife-threatening causes.      ASSESSMENT:  Chest pain  Palpitations  Dizziness  Left bundle branch block  Mixed hyperlipidemia  Primary hypertension  Gastroesophageal reflux disease diabetes  mellitus 2        PLAN:  Continue risk factor modifications including diet, exercise  Continue current CV plan of care  Patient to follow-up in 6 months or sooner should the need arise  Patient was reassured that her symptoms are nonlife threatening and not concerning at this point.        The following portions of the patient's history were reviewed and updated as appropriate: allergies, current medications, past family history, past medical history, past social history, past surgical history and problem list.    REVIEW OF SYSTEMS:    Review of Systems   Cardiovascular: Positive for palpitations.   All other systems reviewed and are negative.      Vitals:    10/11/22 1528   BP: 139/79   Pulse: 109   SpO2: 98%         PHYSICAL EXAM:    General: Alert, cooperative, no distress, appears stated age  Head:  Normocephalic, atraumatic, mucous membranes moist  Eyes:  Conjunctiva/corneas clear, EOM's intact     Neck:  Supple,  no JVD or bruit     Lungs: Clear to auscultation bilaterally, no wheezes rhonchi rales are noted  Chest wall: No tenderness  Musculoskeletal:   Ambulates freely without assistance  Heart::  Regular rate and rhythm, S1 and S2 normal, no murmur, rub or gallop  Abdomen: Soft, non-tender, nondistended, bowel sounds active, no abdominal bruit  Extremities: No cyanosis, clubbing, or edema   Pulses: 2+ and symmetric all extremities  Skin:  No rashes or lesions  Neuro/psych: A&O x3. CN II through XII are grossly intact with appropriate affect        Past Medical History:   Diagnosis Date   • Diabetes mellitus (HCC)    • GERD (gastroesophageal reflux disease)    • Hypertension    • Sleep apnea        History reviewed. No pertinent surgical history.      Current Outpatient Medications:   •  atorvastatin (LIPITOR) 20 MG tablet, Take 1 tablet by mouth Every Night., Disp: 90 tablet, Rfl: 0  •  dexlansoprazole (DEXILANT) 60 MG capsule, Take 60 mg by mouth Daily., Disp: , Rfl:   •  empagliflozin (JARDIANCE) 25 MG  "tablet tablet, Take 25 mg by mouth Daily., Disp: , Rfl:   •  [START ON 10/12/2022] erythromycin (JOSE MARTIN-TAB) 500 MG EC tablet, Take 1 tablet by mouth 4 (Four) Times a Day., Disp: , Rfl:   •  FREESTYLE LITE test strip, 1 each by Other route Daily. Dx: E11.90. Use as instructed, Disp: 50 each, Rfl: 2  •  Lancets (freestyle) lancets, 1 each by Other route Daily. Dx: E11.9, Disp: 100 each, Rfl: 2  •  lisinopril-hydrochlorothiazide (PRINZIDE,ZESTORETIC) 20-12.5 MG per tablet, Take 2 tablets by mouth Daily., Disp: , Rfl:   •  meclizine (ANTIVERT) 25 MG tablet, Take 25 mg by mouth 2 (Two) Times a Day., Disp: , Rfl:   •  Prucalopride Succinate (Motegrity) 2 MG tablet, Take 2 mg by mouth Daily., Disp: , Rfl:   •  Semaglutide (OZEMPIC, 2 MG/DOSE, SC), Inject 0.25 mg under the skin into the appropriate area as directed 1 (One) Time Per Week., Disp: , Rfl:   •  metoprolol tartrate (LOPRESSOR) 25 MG tablet, Take 0.5 tablets by mouth Every 12 (Twelve) Hours for 30 days., Disp: 30 tablet, Rfl: 0    Allergies   Allergen Reactions   • Psyllium Anaphylaxis     Natural oranges   • Compazine [Prochlorperazine Edisylate] Mental Status Change   • Prochlorperazine Other (See Comments)   • Latex Other (See Comments)     Irritation, rash    • Reglan [Metoclopramide] Palpitations   • Victoza [Liraglutide] GI Intolerance       Family History   Problem Relation Age of Onset   • No Known Problems Mother    • No Known Problems Father        Social History     Tobacco Use   • Smoking status: Every Day     Packs/day: 1.00     Types: Cigarettes   • Smokeless tobacco: Not on file   Substance Use Topics   • Alcohol use: Not Currently           Current Electrocardiogram:  Procedures        EMR Dragon/Transcription:   \"Dictated utilizing Dragon dictation\".       "

## 2022-12-15 ENCOUNTER — OFFICE (OUTPATIENT)
Dept: URBAN - METROPOLITAN AREA CLINIC 64 | Facility: CLINIC | Age: 37
End: 2022-12-15
Payer: COMMERCIAL

## 2022-12-15 VITALS
WEIGHT: 240 LBS | HEART RATE: 92 BPM | DIASTOLIC BLOOD PRESSURE: 110 MMHG | SYSTOLIC BLOOD PRESSURE: 159 MMHG | HEIGHT: 63 IN

## 2022-12-15 DIAGNOSIS — R11.2 NAUSEA WITH VOMITING, UNSPECIFIED: ICD-10-CM

## 2022-12-15 DIAGNOSIS — R13.10 DYSPHAGIA, UNSPECIFIED: ICD-10-CM

## 2022-12-15 DIAGNOSIS — E11.43 TYPE 2 DIABETES MELLITUS WITH DIABETIC AUTONOMIC (POLY)NEURO: ICD-10-CM

## 2022-12-15 DIAGNOSIS — K21.9 GASTRO-ESOPHAGEAL REFLUX DISEASE WITHOUT ESOPHAGITIS: ICD-10-CM

## 2022-12-15 DIAGNOSIS — R14.0 ABDOMINAL DISTENSION (GASEOUS): ICD-10-CM

## 2022-12-15 PROCEDURE — 99214 OFFICE O/P EST MOD 30 MIN: CPT | Performed by: NURSE PRACTITIONER

## 2022-12-15 RX ORDER — PRUCALOPRIDE 2 MG/1
TABLET, FILM COATED ORAL
Qty: 90 | Refills: 1 | Status: ACTIVE

## 2023-01-18 ENCOUNTER — HOSPITAL ENCOUNTER (EMERGENCY)
Facility: HOSPITAL | Age: 38
Discharge: HOME | End: 2023-01-18
Attending: EMERGENCY MEDICINE | Admitting: EMERGENCY MEDICINE
Payer: MEDICAID

## 2023-01-18 VITALS
WEIGHT: 239 LBS | SYSTOLIC BLOOD PRESSURE: 152 MMHG | RESPIRATION RATE: 16 BRPM | DIASTOLIC BLOOD PRESSURE: 107 MMHG | HEART RATE: 117 BPM | BODY MASS INDEX: 42.35 KG/M2 | HEIGHT: 63 IN | TEMPERATURE: 97 F | OXYGEN SATURATION: 100 %

## 2023-01-18 DIAGNOSIS — T50.905A ADVERSE EFFECT OF DRUG, INITIAL ENCOUNTER: Primary | ICD-10-CM

## 2023-01-18 LAB — GLUCOSE BLDC GLUCOMTR-MCNC: 124 MG/DL (ref 70–130)

## 2023-01-18 PROCEDURE — 96375 TX/PRO/DX INJ NEW DRUG ADDON: CPT

## 2023-01-18 PROCEDURE — 82962 GLUCOSE BLOOD TEST: CPT

## 2023-01-18 PROCEDURE — 99203 OFFICE O/P NEW LOW 30 MIN: CPT | Performed by: EMERGENCY MEDICINE

## 2023-01-18 PROCEDURE — 99283 EMERGENCY DEPT VISIT LOW MDM: CPT

## 2023-01-18 PROCEDURE — 25010000002 ONDANSETRON PER 1 MG: Performed by: EMERGENCY MEDICINE

## 2023-01-18 PROCEDURE — 25010000002 DIPHENHYDRAMINE PER 50 MG: Performed by: EMERGENCY MEDICINE

## 2023-01-18 PROCEDURE — 96374 THER/PROPH/DIAG INJ IV PUSH: CPT

## 2023-01-18 RX ORDER — DIPHENHYDRAMINE HYDROCHLORIDE 50 MG/ML
25 INJECTION INTRAMUSCULAR; INTRAVENOUS ONCE
Status: COMPLETED | OUTPATIENT
Start: 2023-01-18 | End: 2023-01-18

## 2023-01-18 RX ORDER — ALPRAZOLAM 0.25 MG/1
0.5 TABLET ORAL ONCE
Status: COMPLETED | OUTPATIENT
Start: 2023-01-18 | End: 2023-01-18

## 2023-01-18 RX ORDER — ONDANSETRON 2 MG/ML
4 INJECTION INTRAMUSCULAR; INTRAVENOUS ONCE
Status: COMPLETED | OUTPATIENT
Start: 2023-01-18 | End: 2023-01-18

## 2023-01-18 RX ORDER — MECLIZINE HYDROCHLORIDE 25 MG/1
25 TABLET ORAL ONCE
Status: COMPLETED | OUTPATIENT
Start: 2023-01-18 | End: 2023-01-18

## 2023-01-18 RX ORDER — SODIUM CHLORIDE 0.9 % (FLUSH) 0.9 %
10 SYRINGE (ML) INJECTION AS NEEDED
Status: DISCONTINUED | OUTPATIENT
Start: 2023-01-18 | End: 2023-01-18 | Stop reason: HOSPADM

## 2023-01-18 RX ORDER — HYDROXYZINE PAMOATE 25 MG/1
25 CAPSULE ORAL 3 TIMES DAILY PRN
Qty: 15 CAPSULE | Refills: 1 | Status: SHIPPED | OUTPATIENT
Start: 2023-01-18

## 2023-01-18 RX ADMIN — MECLIZINE HYDROCHLORIDE 25 MG: 25 TABLET ORAL at 19:51

## 2023-01-18 RX ADMIN — ALPRAZOLAM 0.5 MG: 0.25 TABLET ORAL at 19:52

## 2023-01-18 RX ADMIN — ONDANSETRON 4 MG: 2 INJECTION INTRAMUSCULAR; INTRAVENOUS at 20:38

## 2023-01-18 RX ADMIN — DIPHENHYDRAMINE HYDROCHLORIDE 25 MG: 50 INJECTION, SOLUTION INTRAMUSCULAR; INTRAVENOUS at 20:40

## 2023-01-18 NOTE — Clinical Note
[facility] [place] [id number] [place] [address] [place] [address]  Phone: [phone]    Lacie Rivera was seen and treated in our emergency department on 1/18/2023.  She may return to work on 01/20/2023.         Thank you for choosing [facility].    Christi Lowe MD

## 2023-01-19 NOTE — ED NOTES
"Pt visibly in less distress and states, \"I'm ready to go home, and get some sleep.\"  Pt ambulates with steady gait out of department with s/o.   "

## 2023-01-19 NOTE — FSED PROVIDER NOTE
Subjective   History of Present Illness  37 yof complains of anxiety. Pt believes she is having a reaction to compazine. Pt is pacing around the room and does not want to set down.         Review of Systems   Constitutional: Negative.    HENT: Negative.    Respiratory: Negative.    Cardiovascular: Negative.    Gastrointestinal: Negative.    Musculoskeletal: Negative.    Skin: Negative.    Psychiatric/Behavioral: Positive for agitation. Negative for self-injury and suicidal ideas. The patient is nervous/anxious.    All other systems reviewed and are negative.      Past Medical History:   Diagnosis Date   • Diabetes mellitus (HCC)    • GERD (gastroesophageal reflux disease)    • Hypertension    • Sleep apnea        Allergies   Allergen Reactions   • Psyllium Anaphylaxis     Natural oranges   • Compazine [Prochlorperazine Edisylate] Anxiety   • Prochlorperazine Other (See Comments)   • Latex Other (See Comments)     Irritation, rash    • Reglan [Metoclopramide] Palpitations   • Victoza [Liraglutide] GI Intolerance       Past Surgical History:   Procedure Laterality Date   • TONSILLECTOMY         Family History   Problem Relation Age of Onset   • No Known Problems Mother    • No Known Problems Father        Social History     Socioeconomic History   • Marital status: Single   Tobacco Use   • Smoking status: Every Day     Packs/day: 0.50     Types: Cigarettes   Substance and Sexual Activity   • Alcohol use: Not Currently   • Drug use: Never           Objective   Physical Exam  Vitals reviewed.   Constitutional:       General: She is in acute distress.      Appearance: She is not ill-appearing, toxic-appearing or diaphoretic.   HENT:      Head: Normocephalic and atraumatic.      Mouth/Throat:      Mouth: Mucous membranes are moist.   Eyes:      Extraocular Movements: Extraocular movements intact.      Pupils: Pupils are equal, round, and reactive to light.   Cardiovascular:      Rate and Rhythm: Normal rate and regular  rhythm.      Pulses: Normal pulses.   Pulmonary:      Breath sounds: Normal breath sounds.   Musculoskeletal:      Cervical back: Normal range of motion.   Skin:     General: Skin is warm and dry.      Capillary Refill: Capillary refill takes less than 2 seconds.   Neurological:      General: No focal deficit present.      Mental Status: She is alert and oriented to person, place, and time.         Procedures           ED Course    Pt is feeling better and is requesting discharge.                                        MDM   This patient presents with symptoms consistent with acute anxiety reaction brought on by administration of compazine. Low suspicion for acute cardiopulmonary process including ACS, PE, or thoracic aortic dissection. Denies  any other medical complaints. No evidence of alcohol withdrawal symptoms. Presentation not consistent with a medical emergency at this time. No acute indication for psychiatric consultation (without SI/HI, AH/VH). Cautious return precautions discussed with full understanding.    Final diagnoses:   Adverse effect of drug, initial encounter       ED Disposition  ED Disposition     ED Disposition   Discharge    Condition   Stable    Comment   --             Constance Knutson, APRN  [address] [facility] [place] [address] [phone]    Schedule an appointment as soon as possible for a visit in 2 days           Medication List      New Prescriptions    hydrOXYzine pamoate 25 MG capsule  Commonly known as: VISTARIL  Take 1 capsule by mouth 3 (Three) Times a Day As Needed for Itching.        Stop    meclizine 25 MG tablet  Commonly known as: ANTIVERT           Where to Get Your Medications      These medications were sent to [facility]/pharmacy #[id number] - [place], [place] - [address] [place] [address] - [phone]  - [phone] FX  [address] [place] [address] [place] [address]    Hours: 24-hours Phone: [phone]   · hydrOXYzine pamoate 25 MG capsule

## 2023-01-26 ENCOUNTER — ON CAMPUS - OUTPATIENT (OUTPATIENT)
Dept: URBAN - METROPOLITAN AREA HOSPITAL 77 | Facility: HOSPITAL | Age: 38
End: 2023-01-26
Payer: COMMERCIAL

## 2023-01-26 DIAGNOSIS — R11.2 NAUSEA WITH VOMITING, UNSPECIFIED: ICD-10-CM

## 2023-01-26 DIAGNOSIS — K20.90 ESOPHAGITIS, UNSPECIFIED WITHOUT BLEEDING: ICD-10-CM

## 2023-01-26 DIAGNOSIS — K21.9 GASTRO-ESOPHAGEAL REFLUX DISEASE WITHOUT ESOPHAGITIS: ICD-10-CM

## 2023-01-26 PROCEDURE — 43235 EGD DIAGNOSTIC BRUSH WASH: CPT | Performed by: INTERNAL MEDICINE

## 2023-12-06 ENCOUNTER — TELEMEDICINE (OUTPATIENT)
Dept: FAMILY MEDICINE CLINIC | Facility: TELEHEALTH | Age: 38
End: 2023-12-06
Payer: MEDICAID

## 2023-12-06 DIAGNOSIS — J22 LOWER RESPIRATORY INFECTION (E.G., BRONCHITIS, PNEUMONIA, PNEUMONITIS, PULMONITIS): Primary | ICD-10-CM

## 2023-12-06 RX ORDER — AZITHROMYCIN 250 MG/1
TABLET, FILM COATED ORAL
Qty: 6 TABLET | Refills: 0 | Status: SHIPPED | OUTPATIENT
Start: 2023-12-06

## 2023-12-06 RX ORDER — BROMPHENIRAMINE MALEATE, PSEUDOEPHEDRINE HYDROCHLORIDE, AND DEXTROMETHORPHAN HYDROBROMIDE 2; 30; 10 MG/5ML; MG/5ML; MG/5ML
10 SYRUP ORAL 4 TIMES DAILY PRN
Qty: 200 ML | Refills: 0 | Status: SHIPPED | OUTPATIENT
Start: 2023-12-06

## 2023-12-06 RX ORDER — ALBUTEROL SULFATE 90 UG/1
2 AEROSOL, METERED RESPIRATORY (INHALATION) EVERY 4 HOURS PRN
Qty: 18 G | Refills: 0 | Status: SHIPPED | OUTPATIENT
Start: 2023-12-06

## 2023-12-06 RX ORDER — PREDNISONE 20 MG/1
20 TABLET ORAL 2 TIMES DAILY
Qty: 10 TABLET | Refills: 0 | Status: SHIPPED | OUTPATIENT
Start: 2023-12-06 | End: 2023-12-11

## 2023-12-06 NOTE — PROGRESS NOTES
You have chosen to receive care through a telehealth visit.  Do you consent to use a video/audio connection for your medical care today? Yes     CHIEF COMPLAINT  No chief complaint on file.        HPI  Lacie Rivera is a 38 y.o. female  presents with complaint of 3 day history of persistent cough with thick yellow sputum production, nose is runny and congested, wheezing, shortness of breath.  Scratchy throat.  Denies fever, ear pain,     Review of Systems  See HPI    Past Medical History:   Diagnosis Date    Diabetes mellitus     GERD (gastroesophageal reflux disease)     Hypertension     Sleep apnea        Family History   Problem Relation Age of Onset    No Known Problems Mother     No Known Problems Father        Social History     Socioeconomic History    Marital status: Single   Tobacco Use    Smoking status: Every Day     Packs/day: .5     Types: Cigarettes   Substance and Sexual Activity    Alcohol use: Not Currently    Drug use: Never       Lacie Rivera  reports that she has been smoking cigarettes. She has been smoking an average of .5 packs per day. She does not have any smokeless tobacco history on file..               There were no vitals taken for this visit.    PHYSICAL EXAM  Physical Exam   Constitutional: She is oriented to person, place, and time. She appears well-developed and well-nourished. She does not have a sickly appearance. She does not appear ill.   HENT:   Head: Normocephalic and atraumatic.   Pulmonary/Chest: Effort normal.  No respiratory distress (persistent cough during visit; no audible wheezing noted).  Neurological: She is alert and oriented to person, place, and time.           Diagnoses and all orders for this visit:    1. Lower respiratory infection (e.g., bronchitis, pneumonia, pneumonitis, pulmonitis) (Primary)  -     azithromycin (Zithromax Z-Dick) 250 MG tablet; Take 2 tablets by mouth on day 1, then 1 tablet daily on days 2-5  Dispense: 6 tablet; Refill: 0  -      predniSONE (DELTASONE) 20 MG tablet; Take 1 tablet by mouth 2 (Two) Times a Day for 5 days.  Dispense: 10 tablet; Refill: 0  -     albuterol sulfate  (90 Base) MCG/ACT inhaler; Inhale 2 puffs Every 4 (Four) Hours As Needed for Wheezing.  Dispense: 18 g; Refill: 0  -     brompheniramine-pseudoephedrine-DM 30-2-10 MG/5ML syrup; Take 10 mL by mouth 4 (Four) Times a Day As Needed for Congestion, Cough or Allergies.  Dispense: 200 mL; Refill: 0    --take medications as prescribed  --increase fluids, rest as needed, tylenol or ibuprofen for pain  --f/u in 5-7 days if no improvement        FOLLOW-UP  As discussed during visit with PCP/[facility] Care if no improvement or Urgent Care/Emergency Department if worsening of symptoms    Patient verbalizes understanding of medication dosage, comfort measures, instructions for treatment and follow-up.    Jeanine Loya, NIKI  12/06/2023  13:04 EST    The use of a video visit has been reviewed with the patient and verbal informed consent has been obtained. Myself and Lacie Rivera participated in this visit. The patient is located in [address] [place] [address] [place] [facility].    I am located in [place], [place]. [employer] and [employer] were utilized. I spent 8 minutes in the patient's chart for this visit.

## 2024-01-11 ENCOUNTER — OFFICE (OUTPATIENT)
Dept: URBAN - METROPOLITAN AREA CLINIC 64 | Facility: CLINIC | Age: 39
End: 2024-01-11
Payer: COMMERCIAL

## 2024-01-11 VITALS
WEIGHT: 247 LBS | SYSTOLIC BLOOD PRESSURE: 137 MMHG | HEART RATE: 98 BPM | DIASTOLIC BLOOD PRESSURE: 93 MMHG | HEIGHT: 63 IN

## 2024-01-11 DIAGNOSIS — E11.43 TYPE 2 DIABETES MELLITUS WITH DIABETIC AUTONOMIC (POLY)NEURO: ICD-10-CM

## 2024-01-11 DIAGNOSIS — K21.9 GASTRO-ESOPHAGEAL REFLUX DISEASE WITHOUT ESOPHAGITIS: ICD-10-CM

## 2024-01-11 DIAGNOSIS — R11.2 NAUSEA WITH VOMITING, UNSPECIFIED: ICD-10-CM

## 2024-01-11 DIAGNOSIS — R19.4 CHANGE IN BOWEL HABIT: ICD-10-CM

## 2024-01-11 DIAGNOSIS — F17.290 NICOTINE DEPENDENCE, OTHER TOBACCO PRODUCT, UNCOMPLICATED: ICD-10-CM

## 2024-01-11 PROCEDURE — 99214 OFFICE O/P EST MOD 30 MIN: CPT | Performed by: NURSE PRACTITIONER

## 2024-01-11 RX ORDER — DEXLANSOPRAZOLE 60 MG/1
60 CAPSULE, DELAYED RELEASE ORAL
Qty: 90 | Refills: 3 | Status: ACTIVE
Start: 2022-06-30

## 2024-02-08 ENCOUNTER — TELEMEDICINE (OUTPATIENT)
Dept: FAMILY MEDICINE CLINIC | Facility: TELEHEALTH | Age: 39
End: 2024-02-08
Payer: MEDICAID

## 2024-02-08 DIAGNOSIS — K52.9 GASTROENTERITIS: Primary | ICD-10-CM

## 2024-02-08 RX ORDER — ONDANSETRON 8 MG/1
8 TABLET, ORALLY DISINTEGRATING ORAL EVERY 8 HOURS PRN
Qty: 15 TABLET | Refills: 0 | Status: SHIPPED | OUTPATIENT
Start: 2024-02-08 | End: 2024-02-13

## 2024-02-08 NOTE — LETTER
[facility]  [facility]  [address]  [address]  [facility] [address]  Phone: [phone]  Fax: [phone]    Lacie Rivera was seen and treated in our Urgent Care on 2/8/2024.  She may return to work on 2/9/24.      .        Thank you for choosing [facility].      NIKI Beltran

## 2024-02-09 NOTE — PROGRESS NOTES
You have chosen to receive care through a telehealth visit.  Do you consent to use a video/audio connection for your medical care today? Yes     CHIEF COMPLAINT  Chief Complaint   Patient presents with    Vomiting         HPI  Lacie Rivera is a 38 y.o. female  presents with complaint of nausea and states that she vomited today and had to leave work today. She states that her left eye feels tight but denies any vision changes.  She took a COVID test and it was negative.    Review of Systems   Gastrointestinal:  Positive for nausea and vomiting.   All other systems reviewed and are negative.      Past Medical History:   Diagnosis Date    Diabetes mellitus     GERD (gastroesophageal reflux disease)     Hypertension     Sleep apnea        Family History   Problem Relation Age of Onset    No Known Problems Mother     No Known Problems Father        Social History     Socioeconomic History    Marital status: Single   Tobacco Use    Smoking status: Every Day     Packs/day: .5     Types: Cigarettes   Substance and Sexual Activity    Alcohol use: Not Currently    Drug use: Never       Lacie Rivera  reports that she has been smoking cigarettes. She has been smoking an average of .5 packs per day. She does not have any smokeless tobacco history on file.. I have educated her on the risk of diseases from using tobacco products such as cancer, COPD, and heart disease.     I advised her to quit and she is not willing to quit.    I spent  1  minutes counseling the patient.              There were no vitals taken for this visit.    PHYSICAL EXAM  Physical Exam   Constitutional: She appears well-developed and well-nourished.   HENT:   Head: Normocephalic.   Eyes: Pupils are equal, round, and reactive to light.   Pulmonary/Chest: Effort normal.   Musculoskeletal: Normal range of motion.   Neurological: She is alert.   Psychiatric: She has a normal mood and affect.       Results for orders placed or performed during the  hospital encounter of 01/18/23   POC Glucose Once    Specimen: Blood   Result Value Ref Range    Glucose 124 70 - 130 mg/dL       Diagnoses and all orders for this visit:    1. Gastroenteritis (Primary)  -     ondansetron ODT (ZOFRAN-ODT) 8 MG disintegrating tablet; Place 1 tablet on the tongue Every 8 (Eight) Hours As Needed for Nausea or Vomiting for up to 5 days.  Dispense: 15 tablet; Refill: 0          FOLLOW-UP  As discussed during visit with PCP/[facility] Care if no improvement or Urgent Care/Emergency Department if worsening of symptoms    Patient verbalizes understanding of medication dosage, comfort measures, instructions for treatment and follow-up.    Veronica Alonso, APRN  02/08/2024  23:10 EST    The use of a video visit has been reviewed with the patient and verbal informed consent has been obtained. Myself and Lacie Rivera participated in this visit. The patient is located in [address] [place] [address] [place] [facility].    I am located in [place], [place]. [employer] and [employer] were utilized. I spent 10 minutes in the patient's chart for this visit.      Note Disclaimer: At [facility], we believe that sharing information builds trust and better   relationships. You are receiving this note because you recently visited [facility]. It is possible you   will see health information before a provider has talked with you about it. This kind of information can   be easy to misunderstand. To help you fully understand what it means for your health, we urge you to   discuss this note with your provider.

## 2024-02-20 ENCOUNTER — HOSPITAL ENCOUNTER (EMERGENCY)
Facility: HOSPITAL | Age: 39
Discharge: HOME | End: 2024-02-20
Attending: EMERGENCY MEDICINE | Admitting: EMERGENCY MEDICINE

## 2024-02-20 VITALS
TEMPERATURE: 97 F | WEIGHT: 250 LBS | RESPIRATION RATE: 20 BRPM | DIASTOLIC BLOOD PRESSURE: 105 MMHG | HEART RATE: 90 BPM | HEIGHT: 63 IN | OXYGEN SATURATION: 96 % | SYSTOLIC BLOOD PRESSURE: 171 MMHG | BODY MASS INDEX: 44.3 KG/M2

## 2024-02-20 DIAGNOSIS — F41.0 PANIC ATTACK: Primary | ICD-10-CM

## 2024-02-20 LAB
ALBUMIN SERPL-MCNC: 4.3 G/DL (ref 3.5–5.2)
ALBUMIN/GLOB SERPL: 1.4 G/DL
ALP SERPL-CCNC: 74 U/L (ref 39–117)
ALT SERPL W P-5'-P-CCNC: 16 U/L (ref 1–33)
ANION GAP SERPL CALCULATED.3IONS-SCNC: 11.6 MMOL/L (ref 5–15)
AST SERPL-CCNC: 17 U/L (ref 1–32)
BASOPHILS # BLD AUTO: 0.01 10*3/MM3 (ref 0–0.2)
BASOPHILS NFR BLD AUTO: 0.1 % (ref 0–1.5)
BILIRUB SERPL-MCNC: 0.7 MG/DL (ref 0–1.2)
BUN SERPL-MCNC: 7 MG/DL (ref 6–20)
BUN/CREAT SERPL: 8.4 (ref 7–25)
CALCIUM SPEC-SCNC: 9.6 MG/DL (ref 8.6–10.5)
CHLORIDE SERPL-SCNC: 103 MMOL/L (ref 98–107)
CO2 SERPL-SCNC: 22.4 MMOL/L (ref 22–29)
CREAT SERPL-MCNC: 0.83 MG/DL (ref 0.57–1)
DEPRECATED RDW RBC AUTO: 40 FL (ref 37–54)
EGFRCR SERPLBLD CKD-EPI 2021: 92.7 ML/MIN/1.73
EOSINOPHIL # BLD AUTO: 0.04 10*3/MM3 (ref 0–0.4)
EOSINOPHIL NFR BLD AUTO: 0.4 % (ref 0.3–6.2)
ERYTHROCYTE [DISTWIDTH] IN BLOOD BY AUTOMATED COUNT: 13.3 % (ref 12.3–15.4)
GLOBULIN UR ELPH-MCNC: 3 GM/DL
GLUCOSE SERPL-MCNC: 171 MG/DL (ref 65–99)
HCT VFR BLD AUTO: 39.9 % (ref 34–46.6)
HGB BLD-MCNC: 12.8 G/DL (ref 12–15.9)
IMM GRANULOCYTES # BLD AUTO: 0.01 10*3/MM3 (ref 0–0.05)
IMM GRANULOCYTES NFR BLD AUTO: 0.1 % (ref 0–0.5)
LYMPHOCYTES # BLD AUTO: 2.85 10*3/MM3 (ref 0.7–3.1)
LYMPHOCYTES NFR BLD AUTO: 26.1 % (ref 19.6–45.3)
MCH RBC QN AUTO: 26.3 PG (ref 26.6–33)
MCHC RBC AUTO-ENTMCNC: 32.1 G/DL (ref 31.5–35.7)
MCV RBC AUTO: 82.1 FL (ref 79–97)
MONOCYTES # BLD AUTO: 0.82 10*3/MM3 (ref 0.1–0.9)
MONOCYTES NFR BLD AUTO: 7.5 % (ref 5–12)
NEUTROPHILS NFR BLD AUTO: 65.8 % (ref 42.7–76)
NEUTROPHILS NFR BLD AUTO: 7.17 10*3/MM3 (ref 1.7–7)
PLATELET # BLD AUTO: 345 10*3/MM3 (ref 140–450)
PMV BLD AUTO: 9.5 FL (ref 6–12)
POTASSIUM SERPL-SCNC: 3.8 MMOL/L (ref 3.5–5.2)
PROT SERPL-MCNC: 7.3 G/DL (ref 6–8.5)
RBC # BLD AUTO: 4.86 10*6/MM3 (ref 3.77–5.28)
SODIUM SERPL-SCNC: 137 MMOL/L (ref 136–145)
WBC NRBC COR # BLD AUTO: 10.9 10*3/MM3 (ref 3.4–10.8)

## 2024-02-20 PROCEDURE — 85025 COMPLETE CBC W/AUTO DIFF WBC: CPT | Performed by: EMERGENCY MEDICINE

## 2024-02-20 PROCEDURE — 99283 EMERGENCY DEPT VISIT LOW MDM: CPT

## 2024-02-20 PROCEDURE — 96375 TX/PRO/DX INJ NEW DRUG ADDON: CPT

## 2024-02-20 PROCEDURE — 25010000002 ONDANSETRON PER 1 MG: Performed by: EMERGENCY MEDICINE

## 2024-02-20 PROCEDURE — 96374 THER/PROPH/DIAG INJ IV PUSH: CPT

## 2024-02-20 PROCEDURE — 80053 COMPREHEN METABOLIC PANEL: CPT | Performed by: EMERGENCY MEDICINE

## 2024-02-20 PROCEDURE — 25010000002 DIPHENHYDRAMINE PER 50 MG: Performed by: EMERGENCY MEDICINE

## 2024-02-20 RX ORDER — ONDANSETRON 2 MG/ML
4 INJECTION INTRAMUSCULAR; INTRAVENOUS ONCE
Status: COMPLETED | OUTPATIENT
Start: 2024-02-20 | End: 2024-02-20

## 2024-02-20 RX ORDER — DIPHENHYDRAMINE HYDROCHLORIDE 50 MG/ML
25 INJECTION INTRAMUSCULAR; INTRAVENOUS ONCE
Status: COMPLETED | OUTPATIENT
Start: 2024-02-20 | End: 2024-02-20

## 2024-02-20 RX ORDER — HYDROXYZINE PAMOATE 50 MG/1
50 CAPSULE ORAL 3 TIMES DAILY PRN
Qty: 15 CAPSULE | Refills: 0 | Status: SHIPPED | OUTPATIENT
Start: 2024-02-20

## 2024-02-20 RX ORDER — LORAZEPAM 0.5 MG/1
0.5 TABLET ORAL ONCE
Status: COMPLETED | OUTPATIENT
Start: 2024-02-20 | End: 2024-02-20

## 2024-02-20 RX ADMIN — DIPHENHYDRAMINE HYDROCHLORIDE 25 MG: 50 INJECTION, SOLUTION INTRAMUSCULAR; INTRAVENOUS at 22:22

## 2024-02-20 RX ADMIN — LORAZEPAM 0.5 MG: 0.5 TABLET ORAL at 22:07

## 2024-02-20 RX ADMIN — ONDANSETRON 4 MG: 2 INJECTION INTRAMUSCULAR; INTRAVENOUS at 22:22

## 2024-02-20 NOTE — Clinical Note
[facility] FSED [place] [id number] [place] [address] [place] [address]  Phone: [phone]    Lacie Rivera was seen and treated in our emergency department on 2/20/2024.  She may return to work on 02/21/2024.         Thank you for choosing [facility].    Christi Lowe MD

## 2024-02-21 NOTE — FSED PROVIDER NOTE
Subjective   History of Present Illness  38 yof complains of anxiety. She reports she had a bad dream and she has been having anxiety since that time. Pt denies chest pain or shortness of breath. Pt's family reports every few weeks she has one of these episodes.       Review of Systems   Constitutional: Negative.    Psychiatric/Behavioral:  Positive for agitation. Negative for self-injury and suicidal ideas. The patient is nervous/anxious.    All other systems reviewed and are negative.      Past Medical History:   Diagnosis Date    Diabetes mellitus     GERD (gastroesophageal reflux disease)     Hypertension     Sleep apnea        Allergies   Allergen Reactions    Psyllium Anaphylaxis     Natural oranges    Compazine [Prochlorperazine Edisylate] Anxiety    Prochlorperazine Other (See Comments)    Latex Other (See Comments)     Irritation, rash     Reglan [Metoclopramide] Palpitations    Victoza [Liraglutide] GI Intolerance       Past Surgical History:   Procedure Laterality Date    TONSILLECTOMY         Family History   Problem Relation Age of Onset    No Known Problems Mother     No Known Problems Father        Social History     Socioeconomic History    Marital status: Single   Tobacco Use    Smoking status: Every Day     Packs/day: .5     Types: Cigarettes   Substance and Sexual Activity    Alcohol use: Not Currently    Drug use: Never           Objective   Physical Exam  Constitutional:       General: She is in acute distress.   HENT:      Head: Normocephalic and atraumatic.   Eyes:      Extraocular Movements: Extraocular movements intact.      Pupils: Pupils are equal, round, and reactive to light.   Cardiovascular:      Rate and Rhythm: Normal rate and regular rhythm.      Pulses: Normal pulses.      Heart sounds: Normal heart sounds.   Pulmonary:      Effort: Pulmonary effort is normal.      Breath sounds: Normal breath sounds.   Skin:     General: Skin is warm and dry.      Capillary Refill: Capillary  refill takes less than 2 seconds.   Neurological:      General: No focal deficit present.      Mental Status: She is alert and oriented to person, place, and time.   Psychiatric:         Attention and Perception: She is inattentive.         Mood and Affect: Mood is anxious.         Speech: Speech normal.         Behavior: Behavior is agitated.         Thought Content: Thought content normal.         Procedures           ED Course  ED Course as of 02/21/24 0145   Tue Feb 20, 2024   2327 Pt is feeling much better and is requesting discharge at this time.  [BM]      ED Course User Index  [BM] Christi Lowe MD                                           Medical Decision Making  This patient presents with symptoms consistent with acute anxiety reaction / panic attack. Low suspicion for acute cardiopulmonary process including ACS, PE, or thoracic aortic dissection. Denies any ingestions or any other medical complaints. No evidence of alcohol withdrawal symptoms. Given history and physical presentation not consistent with overt toxidrome, ingestion. Presentation not consistent with a medical emergency at this time. No acute indication for psychiatric consultation (without SI/HI, AH/VH). Cautious return precautions discussed with full understanding.    Problems Addressed:  Panic attack: complicated acute illness or injury    Amount and/or Complexity of Data Reviewed  Labs: ordered.    Risk  Prescription drug management.        Final diagnoses:   Panic attack       ED Disposition  ED Disposition       ED Disposition   Discharge    Condition   Stable    Comment   --               Constance Knutson, APRN  [address] [facility] [address]  [phone]    Schedule an appointment as soon as possible for a visit on 2/21/2024      [place] [address] [address]  [phone]  Schedule an appointment as soon as possible for a visit       [facility]    [address] [facility] [address]  [phone]  Schedule an appointment as soon as possible for a visit            Medication List        Changed      * hydrOXYzine pamoate 25 MG capsule  Commonly known as: VISTARIL  Take 1 capsule by mouth 3 (Three) Times a Day As Needed for Itching.  What changed: Another medication with the same name was added. Make sure you understand how and when to take each.     * hydrOXYzine pamoate 50 MG capsule  Commonly known as: VISTARIL  Take 1 capsule by mouth 3 (Three) Times a Day As Needed for Anxiety.  What changed: You were already taking a medication with the same name, and this prescription was added. Make sure you understand how and when to take each.           * This list has 2 medication(s) that are the same as other medications prescribed for you. Read the directions carefully, and ask your doctor or other care provider to review them with you.                   Where to Get Your Medications        These medications were sent to [facility]/pharmacy #[id number] - [place], [place] - [address] [place] [address] - [phone]  - [phone] [address] [place] [address]      Hours: 24-hours Phone: [phone]   hydrOXYzine pamoate 50 MG capsule

## 2024-02-21 NOTE — ED NOTES
Patient is tearful and pacing in the room saying help me, the patients family is present and she is not the best historian.  The patient family states that she came here last time that she had this type of episode.

## 2024-03-03 ENCOUNTER — TELEMEDICINE (OUTPATIENT)
Dept: FAMILY MEDICINE CLINIC | Facility: TELEHEALTH | Age: 39
End: 2024-03-03
Payer: MEDICAID

## 2024-03-03 DIAGNOSIS — R11.2 NAUSEA AND VOMITING, UNSPECIFIED VOMITING TYPE: Primary | ICD-10-CM

## 2024-03-03 DIAGNOSIS — Z75.8 DOES NOT HAVE PRIMARY CARE PROVIDER: ICD-10-CM

## 2024-03-03 DIAGNOSIS — E11.9 TYPE 2 DIABETES MELLITUS WITHOUT COMPLICATION, WITH LONG-TERM CURRENT USE OF INSULIN: ICD-10-CM

## 2024-03-03 DIAGNOSIS — Z79.4 TYPE 2 DIABETES MELLITUS WITHOUT COMPLICATION, WITH LONG-TERM CURRENT USE OF INSULIN: ICD-10-CM

## 2024-03-03 RX ORDER — ONDANSETRON 4 MG/1
4 TABLET, ORALLY DISINTEGRATING ORAL EVERY 8 HOURS PRN
Qty: 9 TABLET | Refills: 0 | Status: SHIPPED | OUTPATIENT
Start: 2024-03-03

## 2024-03-03 RX ORDER — SEMAGLUTIDE 0.68 MG/ML
0.25 INJECTION, SOLUTION SUBCUTANEOUS WEEKLY
Qty: 3 ML | Refills: 0 | Status: SHIPPED | OUTPATIENT
Start: 2024-03-03

## 2024-03-03 NOTE — PROGRESS NOTES
You have chosen to receive care through a telehealth visit.  Do you consent to use a video/audio connection for your medical care today? Yes     HPI  Lacie Rivera is a 38 y.o. female  presents with complaint of not having her Ozempic and this has caused her to feel weak and have nausea and vomiting. This has occurred prior and the same thing happened. She has no PCP due to missed appointments and is totally out of her Ozempic.she has used Zofran for N/v which did help. She reports her most recent FSBS was 200.     Review of Systems   Constitutional: Negative.    Cardiovascular: Negative.    Gastrointestinal: Negative.    Neurological: Negative.    Psychiatric/Behavioral: Negative.         Past Medical History:   Diagnosis Date    Diabetes mellitus     GERD (gastroesophageal reflux disease)     Hypertension     Sleep apnea        Family History   Problem Relation Age of Onset    No Known Problems Mother     No Known Problems Father        Social History     Socioeconomic History    Marital status: Single   Tobacco Use    Smoking status: Every Day     Current packs/day: 0.50     Types: Cigarettes   Substance and Sexual Activity    Alcohol use: Not Currently    Drug use: Never         There were no vitals taken for this visit.    PHYSICAL EXAM  Physical Exam   Constitutional: She is oriented to person, place, and time. She appears well-developed and well-nourished. She does not have a sickly appearance. She does not appear ill. No distress.   Pulmonary/Chest: Effort normal.   Neurological: She is oriented to person, place, and time.   Psychiatric: She has a normal mood and affect.   Vitals reviewed.      Diagnoses and all orders for this visit:    1. Nausea and vomiting, unspecified vomiting type (Primary)  -     ondansetron ODT (ZOFRAN-ODT) 4 MG disintegrating tablet; Place 1 tablet on the tongue Every 8 (Eight) Hours As Needed for Nausea or Vomiting.  Dispense: 9 tablet; Refill: 0    2. Type 2 diabetes mellitus  without complication, with long-term current use of insulin  -     Semaglutide,0.25 or 0.5MG/DOS, (Ozempic, 0.25 or 0.5 MG/DOSE,) 2 MG/3ML solution pen-injector; Inject 0.25 mg under the skin into the appropriate area as directed 1 (One) Time Per Week.  Dispense: 3 mL; Refill: 0  -     Ambulatory Referral to Internal Medicine    3. Does not have primary care provider  -     Ambulatory Referral to Internal Medicine    Rest and increase water.   Check FSBS bid   Follow up in ED for worsening s/s   Follow up with PCP as soon as appointment is scheduled .       FOLLOW-UP  As discussed during visit with [facility], if symptoms worsen or fail to improve, follow-up with PCP/Urgent Care/Emergency Department.    Patient verbalizes understanding of medications, instructions for treatment and follow-up.    Cinthia Fountain, NIKI  03/03/2024  16:23 EST    The use of a video visit has been reviewed with the patient and verbal informed consent has been obtained. Myself and Lacie Rivera participated in this visit. The patient is located in [facility], and I am located in [place], [place]. [employer] and [employer]  were utilized.

## 2024-04-18 ENCOUNTER — TELEMEDICINE (OUTPATIENT)
Dept: FAMILY MEDICINE CLINIC | Facility: TELEHEALTH | Age: 39
End: 2024-04-18
Payer: MEDICAID

## 2024-04-18 DIAGNOSIS — B37.31 VAGINAL YEAST INFECTION: Primary | ICD-10-CM

## 2024-04-18 RX ORDER — NYSTATIN 100000 U/G
1 OINTMENT TOPICAL 2 TIMES DAILY
Qty: 30 G | Refills: 0 | Status: SHIPPED | OUTPATIENT
Start: 2024-04-18

## 2024-04-18 RX ORDER — TRIAMCINOLONE ACETONIDE 1 MG/G
1 OINTMENT TOPICAL 2 TIMES DAILY
Qty: 15 G | Refills: 0 | Status: SHIPPED | OUTPATIENT
Start: 2024-04-18

## 2024-04-18 NOTE — PROGRESS NOTES
You have chosen to receive care through a telehealth visit.  Do you consent to use a video/audio connection for your medical care today? Yes     HPI  Lacie Rivera is a 39 y.o. female  presents with complaint of vaginal itching and burning for 3 days since being on an antibiotic. She is having a pink discharge. She reports no odor or fever. She has already tried 3 diflucan without resolution. She is a diabetic and took Augmentin .     Review of Systems   Constitutional: Negative.    Gastrointestinal: Negative.    Genitourinary:  Positive for vaginal discharge and vaginal pain (vaginal itching.). Negative for vaginal bleeding.   Psychiatric/Behavioral: Negative.         Past Medical History:   Diagnosis Date    Diabetes mellitus     GERD (gastroesophageal reflux disease)     Hypertension     Sleep apnea        Family History   Problem Relation Age of Onset    No Known Problems Mother     No Known Problems Father        Social History     Socioeconomic History    Marital status: Single   Tobacco Use    Smoking status: Every Day     Current packs/day: 0.50     Types: Cigarettes   Substance and Sexual Activity    Alcohol use: Not Currently    Drug use: Never         There were no vitals taken for this visit.    PHYSICAL EXAM  Virtual Visit Physical Exam    There are no diagnoses linked to this encounter.      FOLLOW-UP  As discussed during visit with Virtual Care, if symptoms worsen or fail to improve, follow-up with PCP/Urgent Care/Emergency Department.    Patient verbalizes understanding of medications, instructions for treatment and follow-up.    NIKI Mckoy  04/18/2024  12:14 EDT    The use of a video visit has been reviewed with the patient and verbal informed consent has been obtained. Myself and Lacie Rivera participated in this visit. The patient is located in [facility] and I am located in [place], [place]. [employer] and [employer]  were utilized.

## 2024-04-18 NOTE — LETTER
April 18, 2024     Patient: Lacie Rivera   YOB: 1985   Date of Visit: 4/18/2024       To Whom it May Concern:    Lacie Rivera was seen in my clinic on 4/18/2024. She  may return to work in 2 days.            Sincerely,          NIKI Mckoy        CC:   No Recipients

## 2025-04-01 ENCOUNTER — TELEMEDICINE (OUTPATIENT)
Dept: FAMILY MEDICINE CLINIC | Facility: TELEHEALTH | Age: 40
End: 2025-04-01

## 2025-04-01 DIAGNOSIS — J06.9 UPPER RESPIRATORY TRACT INFECTION, UNSPECIFIED TYPE: Primary | ICD-10-CM

## 2025-04-01 RX ORDER — LISINOPRIL 10 MG/1
TABLET ORAL
COMMUNITY

## 2025-04-01 RX ORDER — ALBUTEROL SULFATE 90 UG/1
2 INHALANT RESPIRATORY (INHALATION) EVERY 4 HOURS PRN
Qty: 18 G | Refills: 0 | Status: SHIPPED | OUTPATIENT
Start: 2025-04-01

## 2025-04-01 RX ORDER — MECLIZINE HYDROCHLORIDE 25 MG/1
25 TABLET ORAL
COMMUNITY
Start: 2024-12-03

## 2025-04-01 RX ORDER — ROSUVASTATIN CALCIUM 5 MG/1
5 TABLET, COATED ORAL DAILY
COMMUNITY
Start: 2024-12-27 | End: 2025-12-27

## 2025-04-01 RX ORDER — OMEPRAZOLE 40 MG/1
CAPSULE, DELAYED RELEASE ORAL
COMMUNITY

## 2025-04-01 RX ORDER — PREDNISONE 10 MG/1
TABLET ORAL
Qty: 21 TABLET | Refills: 0 | Status: SHIPPED | OUTPATIENT
Start: 2025-04-01

## 2025-04-01 RX ORDER — BROMPHENIRAMINE MALEATE, PSEUDOEPHEDRINE HYDROCHLORIDE, AND DEXTROMETHORPHAN HYDROBROMIDE 2; 30; 10 MG/5ML; MG/5ML; MG/5ML
10 SYRUP ORAL 4 TIMES DAILY PRN
Qty: 200 ML | Refills: 0 | Status: SHIPPED | OUTPATIENT
Start: 2025-04-01

## 2025-04-01 RX ORDER — LOPERAMIDE HYDROCHLORIDE 2 MG/1
2 TABLET ORAL
COMMUNITY
Start: 2025-01-02

## 2025-04-01 RX ORDER — FAMOTIDINE 40 MG/1
90 TABLET, FILM COATED ORAL
COMMUNITY

## 2025-04-01 RX ORDER — PROPRANOLOL HYDROCHLORIDE 10 MG/1
10 TABLET ORAL
COMMUNITY
Start: 2024-04-05

## 2025-04-01 RX ORDER — INSULIN GLARGINE 100 [IU]/ML
30 INJECTION, SOLUTION SUBCUTANEOUS DAILY
COMMUNITY
Start: 2024-12-27

## 2025-04-01 RX ORDER — LOSARTAN POTASSIUM AND HYDROCHLOROTHIAZIDE 25; 100 MG/1; MG/1
1 TABLET ORAL DAILY
COMMUNITY
Start: 2024-05-16 | End: 2025-05-16

## 2025-04-01 RX ORDER — FLUTICASONE PROPIONATE AND SALMETEROL 100; 50 UG/1; UG/1
1 POWDER RESPIRATORY (INHALATION)
COMMUNITY
Start: 2024-11-25

## 2025-04-01 RX ORDER — GLIPIZIDE 10 MG/1
10 TABLET, FILM COATED, EXTENDED RELEASE ORAL DAILY
COMMUNITY
Start: 2024-05-16 | End: 2025-05-16

## 2025-04-01 NOTE — LETTER
April 1, 2025     Patient: Lacie Rivera   YOB: 1985   Date of Visit: 4/1/2025       To Whom It May Concern:    It is my medical opinion that Lacie Rivera may return to work on Thursday, April 3, 2025.  Please excuse her absence from work on Tuesday, April 1 and Wednesday, April 2, 2025 due to illness.             Sincerely,        NIKI Gotti    CC: No Recipients

## 2025-04-01 NOTE — PATIENT INSTRUCTIONS
Upper Respiratory Infection, Adult  An upper respiratory infection (URI) is a common viral infection of the nose, throat, and upper air passages that lead to the lungs. The most common type of URI is the common cold. URIs usually get better on their own, without medical treatment.  What are the causes?  A URI is caused by a virus. You may catch a virus by:  Breathing in droplets from an infected person's cough or sneeze.  Touching something that has been exposed to the virus (is contaminated) and then touching your mouth, nose, or eyes.  What increases the risk?  You are more likely to get a URI if:  You are very young or very old.  You have close contact with others, such as at work, school, or a health care facility.  You smoke.  You have long-term (chronic) heart or lung disease.  You have a weakened disease-fighting system (immune system).  You have nasal allergies or asthma.  You are experiencing a lot of stress.  You have poor nutrition.  What are the signs or symptoms?  A URI usually involves some of the following symptoms:  Runny or stuffy (congested) nose.  Cough.  Sneezing.  Sore throat.  Headache.  Fatigue.  Fever.  Loss of appetite.  Pain in your forehead, behind your eyes, and over your cheekbones (sinus pain).  Muscle aches.  Redness or irritation of the eyes.  Pressure in the ears or face.  How is this diagnosed?  This condition may be diagnosed based on your medical history and symptoms, and a physical exam. Your health care provider may use a swab to take a mucus sample from your nose (nasal swab). This sample can be tested to determine what virus is causing the illness.  How is this treated?  URIs usually get better on their own within 7-10 days. Medicines cannot cure URIs, but your health care provider may recommend certain medicines to help relieve symptoms, such as:  Over-the-counter cold medicines.  Cough suppressants. Coughing is a type of defense against infection that helps to clear the  respiratory system, so take these medicines only as recommended by your health care provider.  Fever-reducing medicines.  Follow these instructions at home:  Activity  Rest as needed.  If you have a fever, stay home from work or school until your fever is gone or until your health care provider says your URI cannot spread to other people (is no longer contagious). Your health care provider may have you wear a face mask to prevent your infection from spreading.  Relieving symptoms  Gargle with a mixture of salt and water 3-4 times a day or as needed. To make salt water, completely dissolve ½-1 tsp (3-6 g) of salt in 1 cup (237 mL) of warm water.  Use a cool-mist humidifier to add moisture to the air. This can help you breathe more easily.  Eating and drinking    Drink enough fluid to keep your urine pale yellow.  Eat soups and other clear broths.  General instructions    Take over-the-counter and prescription medicines only as told by your health care provider. These include cold medicines, fever reducers, and cough suppressants.  Do not use any products that contain nicotine or tobacco. These products include cigarettes, chewing tobacco, and vaping devices, such as e-cigarettes. If you need help quitting, ask your health care provider.  Stay away from secondhand smoke.  Stay up to date on all immunizations, including the yearly (annual) flu vaccine.  Keep all follow-up visits. This is important.  How to prevent the spread of infection to others  URIs can be contagious. To prevent the infection from spreading:  Wash your hands with soap and water for at least 20 seconds. If soap and water are not available, use hand .  Avoid touching your mouth, face, eyes, or nose.  Cough or sneeze into a tissue or your sleeve or elbow instead of into your hand or into the air.    Contact a health care provider if:  You are getting worse instead of better.  You have a fever or chills.  Your mucus is brown or red.  You have  yellow or brown discharge coming from your nose.  You have pain in your face, especially when you bend forward.  You have swollen neck glands.  You have pain while swallowing.  You have white areas in the back of your throat.  Get help right away if:  You have shortness of breath that gets worse.  You have severe or persistent:  Headache.  Ear pain.  Sinus pain.  Chest pain.  You have chronic lung disease along with any of the following:  Making high-pitched whistling sounds when you breathe, most often when you breathe out (wheezing).  Prolonged cough (more than 14 days).  Coughing up blood.  A change in your usual mucus.  You have a stiff neck.  You have changes in your:  Vision.  Hearing.  Thinking.  Mood.  These symptoms may be an emergency. Get help right away. Call 911.  Do not wait to see if the symptoms will go away.  Do not drive yourself to the hospital.  Summary  An upper respiratory infection (URI) is a common infection of the nose, throat, and upper air passages that lead to the lungs.  A URI is caused by a virus.  URIs usually get better on their own within 7-10 days.  Medicines cannot cure URIs, but your health care provider may recommend certain medicines to help relieve symptoms.  This information is not intended to replace advice given to you by your health care provider. Make sure you discuss any questions you have with your health care provider.  Document Revised: 07/20/2022 Document Reviewed: 07/20/2022  [employer] Patient Education © 2024 [employer]

## 2025-04-01 NOTE — PROGRESS NOTES
You have chosen to receive care through a telehealth visit.  Do you consent to use a video/audio connection for your medical care today? Yes     Patient or patient representative verbalized consent for the use of Ambient Listening during the visit with  NIKI Gotti for chart documentation. 4/1/2025  08:46 EDT    CHIEF COMPLAINT  No chief complaint on file.        HPI  History of Present Illness  The patient is a 39-year-old female presenting with complaints of not feeling well today.    She reports experiencing a sore throat, cough, sneezing, and a sensation of clogged ears. Additionally, she notes that one nostril is obstructed while the other is exhibiting a runny nose. The onset of her sore throat was 2 days ago, with the remaining symptoms manifesting since the previous night. She does not report any body aches or chills.    ALLERGIES  The patient is allergic to COMPAZINE.       Review of Systems  See HPI    Past Medical History:   Diagnosis Date    Diabetes mellitus     GERD (gastroesophageal reflux disease)     Hypertension     Sleep apnea        Family History   Problem Relation Age of Onset    No Known Problems Mother     No Known Problems Father        Social History     Socioeconomic History    Marital status: Single   Tobacco Use    Smoking status: Every Day     Current packs/day: 0.50     Types: Cigarettes   Substance and Sexual Activity    Alcohol use: Not Currently    Drug use: Never       Lacie BEVERLY Rivera  reports that she has been smoking cigarettes. She does not have any smokeless tobacco history on file.             There were no vitals taken for this visit.    PHYSICAL EXAM  Physical Exam   Constitutional: She is oriented to person, place, and time. She appears well-developed and well-nourished. She does not have a sickly appearance. She does not appear ill.   HENT:   Head: Normocephalic and atraumatic.   Pulmonary/Chest: Effort normal.  No respiratory distress (persistent cough during  visit; no audible wheezing).  Neurological: She is alert and oriented to person, place, and time.           Diagnoses and all orders for this visit:    1. Upper respiratory tract infection, unspecified type (Primary)  -     predniSONE (DELTASONE) 10 MG (21) dose pack; Use as directed on package  Dispense: 21 tablet; Refill: 0  -     brompheniramine-pseudoephedrine-DM 30-2-10 MG/5ML syrup; Take 10 mL by mouth 4 (Four) Times a Day As Needed for Congestion, Cough or Allergies.  Dispense: 200 mL; Refill: 0  -     albuterol sulfate  (90 Base) MCG/ACT inhaler; Inhale 2 puffs Every 4 (Four) Hours As Needed for Wheezing or Shortness of Air.  Dispense: 18 g; Refill: 0    --take medications as prescribed  --increase fluids, rest as needed, tylenol or ibuprofen for pain  --f/u in 5-7 days if no improvement      Assessment & Plan  1. Upper respiratory infection.  She reports a sore throat that started 2 days ago, followed by cough, sneezing, clogged ears, and nasal congestion that began last night. There are no body aches or chills. A steroid pack has been prescribed, and she is advised to follow the directions on the package. Bromfed-DM has been prescribed for her cough and congestion. An inhaler has been prescribed, with instructions to take 2 puffs every 4 hours as needed for wheezing or shortness of breath. All prescriptions have been sent to [facility] in [facility]. A work note has been provided, recommending a return to work on Thursday.         FOLLOW-UP  As discussed during visit with PCP/[facility] if no improvement or Urgent Care/Emergency Department if worsening of symptoms    Patient verbalizes understanding of medication dosage, comfort measures, instructions for treatment and follow-up.    Jeanine Loya, NIKI  04/01/2025  08:46 EDT    Mode of Visit: Video  Location of patient: -HOME-  Location of provider: +HOME+  You have chosen to receive care through a telehealth visit.  The patient has  signed the video visit consent form.  The visit included audio and video interaction. No technical issues occurred during this visit.    The use of a video visit has been reviewed with the patient and verbal informed consent has been obtained. Myself and Lacie Rivera     participated in this visit. The patient is located in [address] [place] [address] [place] [facility]  I am located in [place], [place]. [employer] and [employer] were utilized. I spent 2 minutes in the patient's chart for this visit.      Note Disclaimer: At [facility], we believe that sharing information builds trust and better   relationships. You are receiving this note because you recently visited [facility]. It is possible you   will see health information before a provider has talked with you about it. This kind of information can   be easy to misunderstand. To help you fully understand what it means for your health, we urge you to   discuss this note with your provider.

## 2025-07-13 ENCOUNTER — HOSPITAL ENCOUNTER (EMERGENCY)
Facility: HOSPITAL | Age: 40
Discharge: HOME | End: 2025-07-14
Attending: EMERGENCY MEDICINE | Admitting: EMERGENCY MEDICINE
Payer: MEDICAID

## 2025-07-13 ENCOUNTER — APPOINTMENT (OUTPATIENT)
Dept: CT IMAGING | Facility: HOSPITAL | Age: 40
End: 2025-07-13
Payer: MEDICAID

## 2025-07-13 DIAGNOSIS — S39.011A ABDOMINAL WALL STRAIN, INITIAL ENCOUNTER: Primary | ICD-10-CM

## 2025-07-13 LAB
ALBUMIN SERPL-MCNC: 4 G/DL (ref 3.5–5.2)
ALBUMIN/GLOB SERPL: 1.6 G/DL
ALP SERPL-CCNC: 79 U/L (ref 39–117)
ALT SERPL W P-5'-P-CCNC: 9 U/L (ref 1–33)
ANION GAP SERPL CALCULATED.3IONS-SCNC: 7.7 MMOL/L (ref 5–15)
AST SERPL-CCNC: 12 U/L (ref 1–32)
B-HCG UR QL: NEGATIVE
BASOPHILS # BLD AUTO: 0.02 10*3/MM3 (ref 0–0.2)
BASOPHILS NFR BLD AUTO: 0.2 % (ref 0–1.5)
BILIRUB SERPL-MCNC: <0.2 MG/DL (ref 0–1.2)
BILIRUB UR QL STRIP: NEGATIVE
BUN SERPL-MCNC: 9.1 MG/DL (ref 6–20)
BUN/CREAT SERPL: 10.2 (ref 7–25)
CALCIUM SPEC-SCNC: 8.7 MG/DL (ref 8.6–10.5)
CHLORIDE SERPL-SCNC: 104 MMOL/L (ref 98–107)
CLARITY UR: CLEAR
CO2 SERPL-SCNC: 24.3 MMOL/L (ref 22–29)
COLOR UR: YELLOW
CREAT SERPL-MCNC: 0.89 MG/DL (ref 0.57–1)
DEPRECATED RDW RBC AUTO: 42.1 FL (ref 37–54)
EGFRCR SERPLBLD CKD-EPI 2021: 84.2 ML/MIN/1.73
EOSINOPHIL # BLD AUTO: 0.28 10*3/MM3 (ref 0–0.4)
EOSINOPHIL NFR BLD AUTO: 3.1 % (ref 0.3–6.2)
ERYTHROCYTE [DISTWIDTH] IN BLOOD BY AUTOMATED COUNT: 13 % (ref 12.3–15.4)
GLOBULIN UR ELPH-MCNC: 2.5 GM/DL
GLUCOSE SERPL-MCNC: 171 MG/DL (ref 65–99)
GLUCOSE UR STRIP-MCNC: NEGATIVE MG/DL
HCT VFR BLD AUTO: 35.7 % (ref 34–46.6)
HGB BLD-MCNC: 11.2 G/DL (ref 12–15.9)
HGB UR QL STRIP.AUTO: NEGATIVE
IMM GRANULOCYTES # BLD AUTO: 0.01 10*3/MM3 (ref 0–0.05)
IMM GRANULOCYTES NFR BLD AUTO: 0.1 % (ref 0–0.5)
KETONES UR QL STRIP: NEGATIVE
LEUKOCYTE ESTERASE UR QL STRIP.AUTO: NEGATIVE
LYMPHOCYTES # BLD AUTO: 3.39 10*3/MM3 (ref 0.7–3.1)
LYMPHOCYTES NFR BLD AUTO: 37.3 % (ref 19.6–45.3)
MCH RBC QN AUTO: 27.3 PG (ref 26.6–33)
MCHC RBC AUTO-ENTMCNC: 31.4 G/DL (ref 31.5–35.7)
MCV RBC AUTO: 87.1 FL (ref 79–97)
MONOCYTES # BLD AUTO: 0.62 10*3/MM3 (ref 0.1–0.9)
MONOCYTES NFR BLD AUTO: 6.8 % (ref 5–12)
NEUTROPHILS NFR BLD AUTO: 4.77 10*3/MM3 (ref 1.7–7)
NEUTROPHILS NFR BLD AUTO: 52.5 % (ref 42.7–76)
NITRITE UR QL STRIP: NEGATIVE
PH UR STRIP.AUTO: 7 [PH] (ref 5–8)
PLATELET # BLD AUTO: 304 10*3/MM3 (ref 140–450)
PMV BLD AUTO: 9.2 FL (ref 6–12)
POTASSIUM SERPL-SCNC: 3.6 MMOL/L (ref 3.5–5.2)
PROT SERPL-MCNC: 6.5 G/DL (ref 6–8.5)
PROT UR QL STRIP: NEGATIVE
RBC # BLD AUTO: 4.1 10*6/MM3 (ref 3.77–5.28)
SODIUM SERPL-SCNC: 136 MMOL/L (ref 136–145)
SP GR UR STRIP: 1.02 (ref 1–1.03)
UROBILINOGEN UR QL STRIP: NORMAL
WBC NRBC COR # BLD AUTO: 9.09 10*3/MM3 (ref 3.4–10.8)

## 2025-07-13 PROCEDURE — 81003 URINALYSIS AUTO W/O SCOPE: CPT | Performed by: EMERGENCY MEDICINE

## 2025-07-13 PROCEDURE — 99284 EMERGENCY DEPT VISIT MOD MDM: CPT

## 2025-07-13 PROCEDURE — 80053 COMPREHEN METABOLIC PANEL: CPT | Performed by: EMERGENCY MEDICINE

## 2025-07-13 PROCEDURE — 74176 CT ABD & PELVIS W/O CONTRAST: CPT

## 2025-07-13 PROCEDURE — 36415 COLL VENOUS BLD VENIPUNCTURE: CPT

## 2025-07-13 PROCEDURE — 25810000003 SODIUM CHLORIDE 0.9 % SOLUTION: Performed by: EMERGENCY MEDICINE

## 2025-07-13 PROCEDURE — 96360 HYDRATION IV INFUSION INIT: CPT

## 2025-07-13 PROCEDURE — 85025 COMPLETE CBC W/AUTO DIFF WBC: CPT | Performed by: EMERGENCY MEDICINE

## 2025-07-13 PROCEDURE — 81025 URINE PREGNANCY TEST: CPT | Performed by: EMERGENCY MEDICINE

## 2025-07-13 RX ORDER — CYCLOBENZAPRINE HCL 10 MG
10 TABLET ORAL ONCE
Status: COMPLETED | OUTPATIENT
Start: 2025-07-13 | End: 2025-07-13

## 2025-07-13 RX ADMIN — SODIUM CHLORIDE 1000 ML: 9 INJECTION, SOLUTION INTRAVENOUS at 22:07

## 2025-07-13 RX ADMIN — CYCLOBENZAPRINE HYDROCHLORIDE 10 MG: 10 TABLET, FILM COATED ORAL at 22:08

## 2025-07-14 VITALS
SYSTOLIC BLOOD PRESSURE: 155 MMHG | RESPIRATION RATE: 17 BRPM | HEIGHT: 63 IN | TEMPERATURE: 97.9 F | OXYGEN SATURATION: 100 % | BODY MASS INDEX: 44.3 KG/M2 | DIASTOLIC BLOOD PRESSURE: 79 MMHG | HEART RATE: 98 BPM | WEIGHT: 250 LBS

## 2025-07-14 RX ORDER — CYCLOBENZAPRINE HCL 5 MG
5 TABLET ORAL 3 TIMES DAILY PRN
Qty: 15 TABLET | Refills: 0 | Status: SHIPPED | OUTPATIENT
Start: 2025-07-14

## 2025-07-14 RX ORDER — KETOROLAC TROMETHAMINE 30 MG/ML
15 INJECTION, SOLUTION INTRAMUSCULAR; INTRAVENOUS ONCE
Status: DISCONTINUED | OUTPATIENT
Start: 2025-07-14 | End: 2025-07-14 | Stop reason: HOSPADM

## 2025-07-14 NOTE — FSED PROVIDER NOTE
Subjective   History of Present Illness  40 yof complains of right sided abdominal pain. The patient notes she started having the pain when she was at work. 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 She notes the pain started later so she is unsure if she pulled something. She denies nausea, vomiting, diarrhea or dysuria.       Review of Systems   Constitutional: Negative.  Negative for activity change.   Gastrointestinal:  Positive for abdominal pain.   All other systems reviewed and are negative.      Past Medical History:   Diagnosis Date    Asthma     Diabetes mellitus     GERD (gastroesophageal reflux disease)     Hypertension     Sleep apnea        Allergies   Allergen Reactions    Psyllium Anaphylaxis     Natural oranges    Compazine [Prochlorperazine Edisylate] Anxiety    Prochlorperazine Other (See Comments)    Latex Other (See Comments)     Irritation, rash     Reglan [Metoclopramide] Palpitations    Victoza [Liraglutide] GI Intolerance       Past Surgical History:   Procedure Laterality Date    TONSILLECTOMY         Family History   Problem Relation Age of Onset    No Known Problems Mother     No Known Problems Father        Social History     Socioeconomic History    Marital status: Single   Tobacco Use    Smoking status: Every Day     Current packs/day: 0.50     Types: Cigarettes   Substance and Sexual Activity    Alcohol use: Not Currently    Drug use: Never           Objective   Physical Exam  Constitutional:       General: She is not in acute distress.  HENT:      Head: Normocephalic and atraumatic.      Mouth/Throat:      Mouth: Mucous membranes are moist.      Pharynx: Oropharynx is clear.   Eyes:      Extraocular Movements: Extraocular movements intact.      Pupils: Pupils are equal, round, and reactive to light.   Cardiovascular:      Rate and Rhythm: Normal rate and regular rhythm.      Heart sounds: Normal heart sounds.   Pulmonary:      Effort: Pulmonary effort is normal.      Breath  sounds: Normal breath sounds.   Abdominal:      General: Abdomen is flat.      Palpations: Abdomen is soft.      Tenderness: There is abdominal tenderness. There is no guarding or rebound.       Neurological:      Mental Status: She is alert.         Procedures           ED Course                                           Medical Decision Making  Abdominal exam without peritoneal signs. No evidence of acute abdomen at this time. Well appearing. Given work up, low suspicion for acute hepatobiliary disease (including acute cholecystitis or cholangitis), acute pancreatitis (neg lipase), PUD (including gastric perforation), acute infectious processes (pneumonia, hepatitis, pyelonephritis), acute appendicitis, vascular catastrophe, bowel obstruction, viscus perforation, or diverticulitis. Presentation not consistent with other acute, emergent causes of abdominal pain at this time.    Problems Addressed:  Abdominal wall strain, initial encounter: complicated acute illness or injury    Amount and/or Complexity of Data Reviewed  Labs: ordered.  Radiology: ordered.    Risk  Prescription drug management.        Final diagnoses:   Abdominal wall strain, initial encounter       ED Disposition  ED Disposition       ED Disposition   Discharge    Condition   Stable    Comment   --               Jacy Pantoja, APRN  [address]  [address]  [place] [address]  [phone]    Schedule an appointment as soon as possible for a visit on 7/16/2025           Medication List        New Prescriptions      cyclobenzaprine 5 MG tablet  Commonly known as: FLEXERIL  Take 1 tablet by mouth 3 (Three) Times a Day As Needed for Muscle Spasms.            Stop      azithromycin 250 MG tablet  Commonly known as: Zithromax Z-Dick     brompheniramine-pseudoephedrine-DM 30-2-10 MG/5ML syrup     hydrOXYzine pamoate 50 MG capsule  Commonly known as: VISTARIL     meclizine 25 MG tablet  Commonly known as: ANTIVERT     ondansetron ODT 4 MG  disintegrating tablet  Commonly known as: ZOFRAN-ODT     predniSONE 10 MG (21) dose pack  Commonly known as: DELTASONE               Where to Get Your Medications        These medications were sent to [facility]/pharmacy #[id number] - [place], [place] - [address] [facility] - [phone]  - [phone] [address] [place] [address]      Hours: 24-hours Phone: [phone]   cyclobenzaprine 5 MG tablet